# Patient Record
Sex: FEMALE | Race: WHITE | Employment: OTHER | URBAN - NONMETROPOLITAN AREA
[De-identification: names, ages, dates, MRNs, and addresses within clinical notes are randomized per-mention and may not be internally consistent; named-entity substitution may affect disease eponyms.]

---

## 2018-10-01 ENCOUNTER — HOSPITAL ENCOUNTER (OUTPATIENT)
Dept: CARDIAC CATH/INVASIVE PROCEDURES | Age: 83
Discharge: HOME | End: 2018-10-01
Attending: INTERNAL MEDICINE | Admitting: INTERNAL MEDICINE
Payer: MEDICARE

## 2018-10-01 ENCOUNTER — OFFICE VISIT (OUTPATIENT)
Dept: OTOLARYNGOLOGY | Facility: CLINIC | Age: 83
End: 2018-10-01

## 2018-10-01 VITALS
TEMPERATURE: 98.8 F | OXYGEN SATURATION: 99 % | HEIGHT: 64 IN | WEIGHT: 125 LBS | BODY MASS INDEX: 21.34 KG/M2 | HEART RATE: 60 BPM | RESPIRATION RATE: 17 BRPM | DIASTOLIC BLOOD PRESSURE: 86 MMHG | SYSTOLIC BLOOD PRESSURE: 137 MMHG

## 2018-10-01 VITALS
DIASTOLIC BLOOD PRESSURE: 85 MMHG | BODY MASS INDEX: 20.85 KG/M2 | SYSTOLIC BLOOD PRESSURE: 155 MMHG | WEIGHT: 125.13 LBS | TEMPERATURE: 97.6 F | HEIGHT: 65 IN | HEART RATE: 78 BPM

## 2018-10-01 DIAGNOSIS — H61.23 BILATERAL IMPACTED CERUMEN: Primary | ICD-10-CM

## 2018-10-01 PROBLEM — R07.9 CHEST PAIN: Status: ACTIVE | Noted: 2018-10-01

## 2018-10-01 PROCEDURE — 2500000003 HC RX 250 WO HCPCS

## 2018-10-01 PROCEDURE — 93005 ELECTROCARDIOGRAM TRACING: CPT

## 2018-10-01 PROCEDURE — 99999 PR OFFICE/OUTPT VISIT,PROCEDURE ONLY: CPT | Performed by: INTERNAL MEDICINE

## 2018-10-01 PROCEDURE — 2709999900 HC NON-CHARGEABLE SUPPLY

## 2018-10-01 PROCEDURE — C1894 INTRO/SHEATH, NON-LASER: HCPCS

## 2018-10-01 PROCEDURE — C1760 CLOSURE DEV, VASC: HCPCS

## 2018-10-01 PROCEDURE — 69210 REMOVE IMPACTED EAR WAX UNI: CPT | Performed by: NURSE PRACTITIONER

## 2018-10-01 PROCEDURE — 93458 L HRT ARTERY/VENTRICLE ANGIO: CPT | Performed by: INTERNAL MEDICINE

## 2018-10-01 PROCEDURE — 99024 POSTOP FOLLOW-UP VISIT: CPT | Performed by: INTERNAL MEDICINE

## 2018-10-01 PROCEDURE — 2580000003 HC RX 258: Performed by: INTERNAL MEDICINE

## 2018-10-01 PROCEDURE — 6360000002 HC RX W HCPCS

## 2018-10-01 RX ORDER — OXYBUTYNIN CHLORIDE 5 MG/1
TABLET ORAL
COMMUNITY
Start: 2018-08-21 | End: 2023-03-16

## 2018-10-01 RX ORDER — SODIUM CHLORIDE 9 MG/ML
INJECTION, SOLUTION INTRAVENOUS CONTINUOUS
Status: DISCONTINUED | OUTPATIENT
Start: 2018-10-01 | End: 2018-10-01 | Stop reason: HOSPADM

## 2018-10-01 RX ORDER — PHENOL 1.4 %
600 AEROSOL, SPRAY (ML) MUCOUS MEMBRANE DAILY
COMMUNITY

## 2018-10-01 RX ORDER — DIPHENHYDRAMINE HCL 25 MG
25 TABLET ORAL NIGHTLY
COMMUNITY
End: 2019-07-23

## 2018-10-01 RX ORDER — OXYBUTYNIN CHLORIDE 5 MG/1
5 TABLET ORAL 2 TIMES DAILY
COMMUNITY

## 2018-10-01 RX ORDER — CHLORHEXIDINE GLUCONATE 0.12 MG/ML
RINSE ORAL
COMMUNITY
Start: 2018-08-02

## 2018-10-01 RX ORDER — LEVOTHYROXINE SODIUM 0.07 MG/1
75 TABLET ORAL DAILY
COMMUNITY

## 2018-10-01 RX ORDER — SODIUM CHLORIDE 0.9 % (FLUSH) 0.9 %
10 SYRINGE (ML) INJECTION PRN
Status: DISCONTINUED | OUTPATIENT
Start: 2018-10-01 | End: 2018-10-01 | Stop reason: HOSPADM

## 2018-10-01 RX ORDER — GLUCOSAMINE/D3/BOSWELLIA SERRA 1500MG-400
TABLET ORAL
COMMUNITY

## 2018-10-01 RX ORDER — BIOTIN 1000 MCG
1000 TABLET,CHEWABLE ORAL DAILY
COMMUNITY

## 2018-10-01 RX ORDER — SODIUM CHLORIDE 0.9 % (FLUSH) 0.9 %
10 SYRINGE (ML) INJECTION EVERY 12 HOURS SCHEDULED
Status: DISCONTINUED | OUTPATIENT
Start: 2018-10-01 | End: 2018-10-01 | Stop reason: HOSPADM

## 2018-10-01 RX ORDER — LEVOTHYROXINE SODIUM 0.05 MG/1
TABLET ORAL
COMMUNITY
Start: 2018-09-21

## 2018-10-01 RX ADMIN — SODIUM CHLORIDE: 9 INJECTION, SOLUTION INTRAVENOUS at 14:00

## 2018-10-02 LAB
EKG P AXIS: 76 DEGREES
EKG P-R INTERVAL: 160 MS
EKG Q-T INTERVAL: 430 MS
EKG QRS DURATION: 84 MS
EKG QTC CALCULATION (BAZETT): 431 MS
EKG T AXIS: 63 DEGREES

## 2018-10-02 NOTE — H&P
Medical History:    Past Medical History:   Diagnosis Date    Cancer [facility])     SKIN CANCER    Macular degeneration     Mitral valve prolapse     Thyroid disease          Past Surgical History:    Past Surgical History:   Procedure Laterality Date    CATARACT REMOVAL Bilateral     JOINT REPLACEMENT      LEF TKNEE REPLACEMENT    JOINT REPLACEMENT      BILATERAL HIPS         Home Medications:   Prior to Admission medications    Medication Sig Start Date End Date Taking? Authorizing Provider   levothyroxine (SYNTHROID) 75 MCG tablet Take 75 mcg by mouth Daily   Yes Historical Provider, MD   sertraline (ZOLOFT) 50 MG tablet Take 50 mg by mouth daily   Yes Historical Provider, MD   oxybutynin (DITROPAN) 5 MG tablet Take 5 mg by mouth 2 times daily   Yes Historical Provider, MD   Calcium Carb-Cholecalciferol (CALCIUM 600 + D PO) Take 1 tablet by mouth Daily with lunch   Yes Historical Provider, MD   diphenhydrAMINE (BENADRYL) 25 MG tablet Take 25 mg by mouth nightly   Yes Historical Provider, MD   Biotin 1000 MCG CHEW Take 1,000 mcg by mouth daily   Yes Historical Provider, MD   CRANBERRY CONCENTRATE PO Take 850 mg by mouth 2 times daily   Yes Historical Provider, MD        Facility Administered Medications: Allergies:  Patient has no known allergies. Social History:       Social History     Social History    Marital status:      Spouse name: N/A    Number of children: N/A    Years of education: N/A     Occupational History    Not on file. Social History Main Topics    Smoking status: Former Smoker    Smokeless tobacco: Never Used    Alcohol use 4.2 oz/week     7 Cans of beer per week    Drug use: Unknown    Sexual activity: Not on file     Other Topics Concern    Not on file     Social History Narrative    No narrative on file       Family History:   History reviewed. No pertinent family history.       REVIEW OF SYSTEMS:     Except as noted in the HPI, all other systems are negative        PHYSICAL EXAMINATION:     BP (!) 146/74   Pulse 62   Temp 98.8 °F (37.1 °C) (Temporal)   Resp 16   Ht 5' 4\" (1.626 m)   Wt 125 lb (56.7 kg)   SpO2 99%   BMI 21.46 kg/m²     GENERAL - well developed and well nourished, in no amount of generalized distress; is an active participant in this examination  HEENT -  PERRLA, Hearing appears normal, conjunctiva and lids are normal, ears and nose appear normal  NECK - no thyromegaly, no JVD, trachea is in the midline  CARDIOVASCULAR - PMI is in the left mid line clavicular position, Normal S1 and S2 with a grade 1/6 systolic murmur. No S3 or S4    PULMONARY - No respiratory distress. scattered wheezes and rales. Breath sounds in both  lung fields are Decreased  ABDOMEN  - soft, non tender, no rebound, no hepatomegaly or splenomegaly  MUSCULOSKELETAL  - Prone/Supine, digitals and nails are without clubbing or cyanosis  EXTREMITIES - trace edema  NEUROLOGIC - cranial nerves, II-XII, are normal  SKIN - turgor is normal, no rash  PSYCHIATRIC - normal mood and affect, alert and orientated x 3, judgement and insight appear appropriate      LABORATORY EVALUATION & TESTING:    I have personally reviewed and interpreted the results of the following diagnostic testing      EKG and or Telemetry:  which was personally reviewed me:  Sinus rhythm,   Pulse Readings from Last 1 Encounters:   10/01/18 62    bpm,  without Acute changes    Troponin:  Not obtained; the creatinine is not obtained    CBC: No results for input(s): WBC, HGB, HCT, MCV, PLT in the last 72 hours. BMP: No results for input(s): NA, K, CL, CO2, PHOS, BUN, CREATININE in the last 72 hours. Invalid input(s): CA  Cardiac Enzymes: No results for input(s): CKTOTAL, CKMB, CKMBINDEX, TROPONINI in the last 72 hours. PT/INR: No results for input(s): PROTIME, INR in the last 72 hours. APTT: No results for input(s): APTT in the last 72 hours.   Liver Profile:  No results found for: AST, ALT, ALB, Systolic (85WCH), MQN:225 , Min:133 , OER:036    Diastolic (24BBZ), REANNA:68, Min:63, Max:102   No Continue current medications:       yes         PLAN:    1. Continue present medications except for changes as noted above  2. Continue to monitor rhythm  3. Further orders per clinical course. 4. Cardiac catheterization  5. I have discussed the risks, benefits and options with the patient and her family. They appear to understand, have no questions, and wish to proceed. This procedure is scheduled for today . Discussed with patient and family and nursing.     I greatly appreciate the opportunity and your confidence in allowing me to participate in the care of Ashley Dodson    Electronically signed by Anjelica Asher MD on 10/1/18     [id number] [facility] [place]

## 2018-10-02 NOTE — DISCHARGE SUMMARY
findings, AUC indication 15, AUC score 4, Diagnostic Catheterization Appropriate Use Criteria Description, [facility] 2012;[id number] [facility]). Selective left heart and coronary arteriography was preformed, which revealed:    1. Successful femoral artery ultrasound  2. Successful femoral artery arteriogram  3. Mild coronary artery disease  4. Left ventricular function is normal.      There were no apparent complications. This was a most reassuring report and strongly suggested that the patient's chest discomfort was of low probability of representing myocardial ischemia. The rest of the patient's hospitalization was uneventful. She was discharged home on medical therapy with outpatient follow up. Consults:     None      Significant Diagnostic Studies:    1. Selective left heart and coronary arteriography, (femoral approach), 10/01/18      Significant Therapeutic Endeavors:      1. none      Activity: activity as directed per discharge instructions. Diet:  Cardiac diet    Labs: For convenience and continuity at follow-up the following most recent labs are provided:    CBC:  No results found for: WBC, HGB, HCT, PLT    Renal:  No results found for: NA, K, CL, CO2, BUN, CREATININE, CALCIUM, PHOS      Discharge Medications:     Current Discharge Medication List           Details   levothyroxine (SYNTHROID) 75 MCG tablet Take 75 mcg by mouth Daily      sertraline (ZOLOFT) 50 MG tablet Take 50 mg by mouth daily      oxybutynin (DITROPAN) 5 MG tablet Take 5 mg by mouth 2 times daily      Calcium Carb-Cholecalciferol (CALCIUM 600 + D PO) Take 1 tablet by mouth Daily with lunch      diphenhydrAMINE (BENADRYL) 25 MG tablet Take 25 mg by mouth nightly      Biotin 1000 MCG CHEW Take 1,000 mcg by mouth daily      CRANBERRY CONCENTRATE PO Take 850 mg by mouth 2 times daily                 Disposition:      1. Reassurance  2. Risk factor modification  3.   Evaluation of etiologies of non

## 2019-05-01 ENCOUNTER — OFFICE VISIT (OUTPATIENT)
Dept: OTOLARYNGOLOGY | Facility: CLINIC | Age: 84
End: 2019-05-01

## 2019-05-01 VITALS
TEMPERATURE: 97.8 F | BODY MASS INDEX: 22.06 KG/M2 | SYSTOLIC BLOOD PRESSURE: 140 MMHG | DIASTOLIC BLOOD PRESSURE: 88 MMHG | HEART RATE: 74 BPM | HEIGHT: 65 IN | WEIGHT: 132.38 LBS

## 2019-05-01 DIAGNOSIS — H61.23 BILATERAL IMPACTED CERUMEN: Primary | ICD-10-CM

## 2019-05-01 PROCEDURE — 69210 REMOVE IMPACTED EAR WAX UNI: CPT | Performed by: NURSE PRACTITIONER

## 2019-07-05 ENCOUNTER — HOSPITAL ENCOUNTER (INPATIENT)
Age: 84
LOS: 4 days | Discharge: HOME HEALTH | DRG: 291 | End: 2019-07-09
Attending: EMERGENCY MEDICINE | Admitting: HOSPITALIST
Payer: MEDICARE

## 2019-07-05 ENCOUNTER — APPOINTMENT (OUTPATIENT)
Dept: GENERAL RADIOLOGY | Age: 84
DRG: 291 | End: 2019-07-05
Payer: MEDICARE

## 2019-07-05 DIAGNOSIS — R07.9 CHEST PAIN, UNSPECIFIED TYPE: ICD-10-CM

## 2019-07-05 DIAGNOSIS — I48.91 ATRIAL FIBRILLATION WITH RVR (HCC): Primary | ICD-10-CM

## 2019-07-05 DIAGNOSIS — I50.9 ACUTE CONGESTIVE HEART FAILURE, UNSPECIFIED HEART FAILURE TYPE (HCC): ICD-10-CM

## 2019-07-05 LAB
ALBUMIN SERPL-MCNC: 3.8 G/DL (ref 3.5–5.2)
ALP BLD-CCNC: 98 U/L (ref 35–104)
ALT SERPL-CCNC: 47 U/L (ref 5–33)
ANION GAP SERPL CALCULATED.3IONS-SCNC: 13 MMOL/L (ref 7–19)
AST SERPL-CCNC: 87 U/L (ref 5–32)
BASOPHILS ABSOLUTE: 0.1 K/UL (ref 0–0.2)
BASOPHILS RELATIVE PERCENT: 0.7 % (ref 0–1)
BILIRUB SERPL-MCNC: 0.3 MG/DL (ref 0.2–1.2)
BUN BLDV-MCNC: 15 MG/DL (ref 8–23)
CALCIUM SERPL-MCNC: 8.7 MG/DL (ref 8.8–10.2)
CHLORIDE BLD-SCNC: 101 MMOL/L (ref 98–111)
CO2: 24 MMOL/L (ref 22–29)
CREAT SERPL-MCNC: 0.7 MG/DL (ref 0.5–0.9)
EOSINOPHILS ABSOLUTE: 0.3 K/UL (ref 0–0.6)
EOSINOPHILS RELATIVE PERCENT: 3.7 % (ref 0–5)
GFR NON-AFRICAN AMERICAN: >60
GLUCOSE BLD-MCNC: 104 MG/DL (ref 74–109)
HCT VFR BLD CALC: 41.6 % (ref 37–47)
HEMOGLOBIN: 13.5 G/DL (ref 12–16)
LV EF: 45 %
LVEF MODALITY: NORMAL
LYMPHOCYTES ABSOLUTE: 2 K/UL (ref 1.1–4.5)
LYMPHOCYTES RELATIVE PERCENT: 27.6 % (ref 20–40)
MAGNESIUM: 2.1 MG/DL (ref 1.6–2.4)
MCH RBC QN AUTO: 30.8 PG (ref 27–31)
MCHC RBC AUTO-ENTMCNC: 32.5 G/DL (ref 33–37)
MCV RBC AUTO: 95 FL (ref 81–99)
MONOCYTES ABSOLUTE: 0.6 K/UL (ref 0–0.9)
MONOCYTES RELATIVE PERCENT: 8.5 % (ref 0–10)
NEUTROPHILS ABSOLUTE: 4.3 K/UL (ref 1.5–7.5)
NEUTROPHILS RELATIVE PERCENT: 59.2 % (ref 50–65)
PDW BLD-RTO: 12.6 % (ref 11.5–14.5)
PLATELET # BLD: 231 K/UL (ref 130–400)
PMV BLD AUTO: 10.5 FL (ref 9.4–12.3)
POTASSIUM SERPL-SCNC: 4.2 MMOL/L (ref 3.5–5)
PRO-BNP: 4002 PG/ML (ref 0–1800)
RBC # BLD: 4.38 M/UL (ref 4.2–5.4)
SODIUM BLD-SCNC: 138 MMOL/L (ref 136–145)
T4 FREE: 1.2 NG/DL (ref 0.9–1.7)
TOTAL PROTEIN: 7 G/DL (ref 6.6–8.7)
TROPONIN: <0.01 NG/ML (ref 0–0.03)
TSH SERPL DL<=0.05 MIU/L-ACNC: 11.05 UIU/ML (ref 0.27–4.2)
WBC # BLD: 7.3 K/UL (ref 4.8–10.8)

## 2019-07-05 PROCEDURE — 99222 1ST HOSP IP/OBS MODERATE 55: CPT | Performed by: HOSPITALIST

## 2019-07-05 PROCEDURE — 2500000003 HC RX 250 WO HCPCS: Performed by: EMERGENCY MEDICINE

## 2019-07-05 PROCEDURE — 93306 TTE W/DOPPLER COMPLETE: CPT

## 2019-07-05 PROCEDURE — 83735 ASSAY OF MAGNESIUM: CPT

## 2019-07-05 PROCEDURE — 71045 X-RAY EXAM CHEST 1 VIEW: CPT

## 2019-07-05 PROCEDURE — 84439 ASSAY OF FREE THYROXINE: CPT

## 2019-07-05 PROCEDURE — 99285 EMERGENCY DEPT VISIT HI MDM: CPT

## 2019-07-05 PROCEDURE — 6360000002 HC RX W HCPCS: Performed by: EMERGENCY MEDICINE

## 2019-07-05 PROCEDURE — 99285 EMERGENCY DEPT VISIT HI MDM: CPT | Performed by: EMERGENCY MEDICINE

## 2019-07-05 PROCEDURE — 6370000000 HC RX 637 (ALT 250 FOR IP): Performed by: INTERNAL MEDICINE

## 2019-07-05 PROCEDURE — 36415 COLL VENOUS BLD VENIPUNCTURE: CPT

## 2019-07-05 PROCEDURE — 83880 ASSAY OF NATRIURETIC PEPTIDE: CPT

## 2019-07-05 PROCEDURE — 96372 THER/PROPH/DIAG INJ SC/IM: CPT

## 2019-07-05 PROCEDURE — 6370000000 HC RX 637 (ALT 250 FOR IP): Performed by: HOSPITALIST

## 2019-07-05 PROCEDURE — 80053 COMPREHEN METABOLIC PANEL: CPT

## 2019-07-05 PROCEDURE — 84484 ASSAY OF TROPONIN QUANT: CPT

## 2019-07-05 PROCEDURE — 96375 TX/PRO/DX INJ NEW DRUG ADDON: CPT

## 2019-07-05 PROCEDURE — 93005 ELECTROCARDIOGRAM TRACING: CPT

## 2019-07-05 PROCEDURE — 6360000002 HC RX W HCPCS: Performed by: HOSPITALIST

## 2019-07-05 PROCEDURE — 85025 COMPLETE CBC W/AUTO DIFF WBC: CPT

## 2019-07-05 PROCEDURE — 84443 ASSAY THYROID STIM HORMONE: CPT

## 2019-07-05 PROCEDURE — 96374 THER/PROPH/DIAG INJ IV PUSH: CPT

## 2019-07-05 PROCEDURE — 2140000000 HC CCU INTERMEDIATE R&B

## 2019-07-05 PROCEDURE — 2580000003 HC RX 258: Performed by: EMERGENCY MEDICINE

## 2019-07-05 RX ORDER — DILTIAZEM HYDROCHLORIDE 60 MG/1
60 CAPSULE, EXTENDED RELEASE ORAL 2 TIMES DAILY
Status: DISCONTINUED | OUTPATIENT
Start: 2019-07-05 | End: 2019-07-07

## 2019-07-05 RX ORDER — DIGOXIN 125 MCG
125 TABLET ORAL DAILY
Status: DISCONTINUED | OUTPATIENT
Start: 2019-07-05 | End: 2019-07-09 | Stop reason: HOSPADM

## 2019-07-05 RX ORDER — BIOTIN 1000 MCG
1000 TABLET,CHEWABLE ORAL DAILY
Status: DISCONTINUED | OUTPATIENT
Start: 2019-07-05 | End: 2019-07-05

## 2019-07-05 RX ORDER — DILTIAZEM HYDROCHLORIDE 5 MG/ML
10 INJECTION INTRAVENOUS ONCE
Status: COMPLETED | OUTPATIENT
Start: 2019-07-05 | End: 2019-07-05

## 2019-07-05 RX ORDER — FUROSEMIDE 10 MG/ML
40 INJECTION INTRAMUSCULAR; INTRAVENOUS DAILY
Status: DISCONTINUED | OUTPATIENT
Start: 2019-07-06 | End: 2019-07-08

## 2019-07-05 RX ORDER — DIPHENHYDRAMINE HCL 25 MG
25 TABLET ORAL NIGHTLY
Status: DISCONTINUED | OUTPATIENT
Start: 2019-07-05 | End: 2019-07-09 | Stop reason: HOSPADM

## 2019-07-05 RX ORDER — FUROSEMIDE 10 MG/ML
40 INJECTION INTRAMUSCULAR; INTRAVENOUS ONCE
Status: COMPLETED | OUTPATIENT
Start: 2019-07-05 | End: 2019-07-05

## 2019-07-05 RX ADMIN — DIPHENHYDRAMINE HCL 25 MG: 25 TABLET ORAL at 20:46

## 2019-07-05 RX ADMIN — FUROSEMIDE 40 MG: 10 INJECTION, SOLUTION INTRAMUSCULAR; INTRAVENOUS at 10:41

## 2019-07-05 RX ADMIN — ENOXAPARIN SODIUM 60 MG: 100 INJECTION SUBCUTANEOUS at 20:46

## 2019-07-05 RX ADMIN — DILTIAZEM HYDROCHLORIDE 10 MG: 5 INJECTION INTRAVENOUS at 08:45

## 2019-07-05 RX ADMIN — DILTIAZEM HYDROCHLORIDE 60 MG: 60 CAPSULE, EXTENDED RELEASE ORAL at 20:46

## 2019-07-05 RX ADMIN — DIGOXIN 125 MCG: 125 TABLET ORAL at 15:36

## 2019-07-05 RX ADMIN — DILTIAZEM HYDROCHLORIDE 60 MG: 60 CAPSULE, EXTENDED RELEASE ORAL at 15:36

## 2019-07-05 RX ADMIN — ENOXAPARIN SODIUM 60 MG: 100 INJECTION SUBCUTANEOUS at 10:41

## 2019-07-05 RX ADMIN — DILTIAZEM HYDROCHLORIDE 5 MG/HR: 5 INJECTION INTRAVENOUS at 09:08

## 2019-07-05 ASSESSMENT — ENCOUNTER SYMPTOMS
GASTROINTESTINAL NEGATIVE: 1
DIARRHEA: 0
EYES NEGATIVE: 1
WHEEZING: 0
ABDOMINAL PAIN: 0
CHEST TIGHTNESS: 1
SHORTNESS OF BREATH: 1
VOMITING: 0
ALLERGIC/IMMUNOLOGIC NEGATIVE: 1
NAUSEA: 0

## 2019-07-05 ASSESSMENT — PAIN SCALES - GENERAL
PAINLEVEL_OUTOF10: 0
PAINLEVEL_OUTOF10: 0

## 2019-07-05 NOTE — CONSULTS
[address] [facility]  Cardiology Consult      Requesting MD:  Devyn Chery, *   Admit Status:  Inpatient [101]       History obtained from:   [] Patient  [] Other (specify):     Patient:  Zaheer Hu  [id number]     Chief Complaint:   Chief Complaint   Patient presents with    Chest Pain     patient complained of cp last night, patient no longer having cp    Fatigue     Patient complains of chest pain last night. Patient states she just feels weak and doesn't feel write. Per EMS patient was A-fib RVR on monior. Patient denies any cardiac history only has history of anxiety. Cardiac history .     1. Chest discomfort Etiology, doubt myocardial ischemia, cardiac catheterization, 10/01/18 mild CAD, normal left ventricular function     9/26/2018  DSE Positive for myocardial ischemia, low risk findings, AUC indication 15, AUC score 4  10/1/18  Cath  Mild CAD, normal LVFX     2. History of thyroid disease  3. History of mitral valve prolapse             Review of Systems:  Review of Systems   Constitutional: Negative. HENT: Negative. Eyes: Negative. Respiratory: Positive for chest tightness and shortness of breath. Cardiovascular: Positive for chest pain. Gastrointestinal: Negative. Endocrine: Negative. Genitourinary: Negative. Musculoskeletal: Negative. Allergic/Immunologic: Negative. Neurological: Negative. Hematological: Negative. Psychiatric/Behavioral: Negative.         Cardiac Specific Data:  Specialty Problems        Cardiology Problems    Atrial fibrillation with RVR [facility])              Past Medical History:  Past Medical History:   Diagnosis Date    Cancer [facility])     SKIN CANCER    Chest pain 10/1/2018    9/26/2018  DSE Positive for myocardial ischemia, low risk findings, AUC indication 15, AUC score 4 [facility]) 10/1/18  Cath  Mild CAD, normal LVFX     Macular degeneration     Mitral valve Admission medications    Medication Sig Start Date End Date Taking? Authorizing Provider   levothyroxine (SYNTHROID) 75 MCG tablet Take 75 mcg by mouth Daily   Yes Historical Provider, MD   sertraline (ZOLOFT) 50 MG tablet Take 50 mg by mouth daily   Yes Historical Provider, MD   oxybutynin (DITROPAN) 5 MG tablet Take 5 mg by mouth 2 times daily   Yes Historical Provider, MD   Calcium Carb-Cholecalciferol (CALCIUM 600 + D PO) Take 1 tablet by mouth Daily with lunch   Yes Historical Provider, MD   diphenhydrAMINE (BENADRYL) 25 MG tablet Take 25 mg by mouth nightly   Yes Historical Provider, MD   CRANBERRY CONCENTRATE PO Take 850 mg by mouth 2 times daily   Yes Historical Provider, MD   Biotin 1000 MCG CHEW Take 1,000 mcg by mouth daily    Historical Provider, MD       Current Meds:   diphenhydrAMINE  25 mg Oral Nightly    levothyroxine  75 mcg Oral Daily    sertraline  50 mg Oral Daily    [START ON 7/6/2019] furosemide  40 mg Intravenous Daily    enoxaparin  1 mg/kg Subcutaneous BID    digoxin  125 mcg Oral Daily    diltiazem  60 mg Oral BID       Current Infused Meds:   diltiazem (CARDIZEM) 125 mg in dextrose 5% 125 mL infusion 5 mg/hr (07/05/19 0908)       Physical Exam:  Vitals:    07/05/19 1137   BP:    Pulse: 111   Resp:    Temp:    SpO2:      No intake or output data in the 24 hours ending 07/05/19 1359  Estimated body mass index is 23.78 kg/m² as calculated from the following:    Height as of this encounter: 5' 2\" (1.575 m). Weight as of this encounter: 130 lb (59 kg). Physical Exam   Constitutional: She is oriented to person, place, and time. She appears well-developed and well-nourished. HENT:   Head: Normocephalic and atraumatic. Eyes: Pupils are equal, round, and reactive to light. EOM are normal.   Neck: Normal range of motion. Neck supple. Cardiovascular: Normal rate, regular rhythm and normal heart sounds.    Pulmonary/Chest: Effort normal and breath sounds normal.   Abdominal: Soft. Bowel sounds are normal.   Musculoskeletal: Normal range of motion. Neurological: She is alert and oriented to person, place, and time. Skin: Skin is warm and dry. Psychiatric: She has a normal mood and affect. Labs:  Recent Labs     07/05/19  0825   WBC 7.3   HGB 13.5          Recent Labs     07/05/19  0825      K 4.2      CO2 24   BUN 15   CREATININE 0.7   LABGLOM >60   MG 2.1   CALCIUM 8.7*       CK, CKMB, Troponin: @LABRCNT (CKTOTAL:3, CKMB:3, TROPONINI:3)@    Last 3 BNP:  No results for input(s): BNP in the last 72 hours. IMAGING:  Xr Chest Portable    Result Date: 7/5/2019  XR CHEST PORTABLE 7/5/2019 8:15 AM HISTORY: Dyspnea COMPARISON: None. FINDINGS: Underlying cardiomegaly. Central pulmonary vascular congestion with diffuse coarsening of interstitial markings. Subpleural Yolanda B-lines noted. No consolidation. No pleural effusion or pneumothorax. The osseous structures and surrounding soft tissues demonstrate no acute abnormality. 1. Volume overload with interstitial edema. No consolidation or pleural effusion. Signed by Dr Macrina Whitney on 7/5/2019 9:26 AM      Assessment:   Afib RVR  Normal cath before    Recommendations:  Rate control. Adde eliquiz 5 mg po bid  Fu dr Chiquita Salinas as OP    D/w pt and family.  They understand risk of bleeding

## 2019-07-05 NOTE — ED PROVIDER NOTES
Neurological: Negative for dizziness and syncope. All other systems reviewed and are negative. PAST MEDICALHISTORY     Past Medical History:   Diagnosis Date    Cancer [facility])     SKIN CANCER    Chest pain 10/1/2018    9/26/2018  DSE Positive for myocardial ischemia, low risk findings, AUC indication 15, AUC score 4 [facility]) 10/1/18  Cath  Mild CAD, normal LVFX     Macular degeneration     Mitral valve prolapse     Thyroid disease          SURGICAL HISTORY       Past Surgical History:   Procedure Laterality Date    CATARACT REMOVAL Bilateral     JOINT REPLACEMENT      LEF TKNEE REPLACEMENT    JOINT REPLACEMENT      BILATERAL HIPS         CURRENT MEDICATIONS     Previous Medications    BIOTIN 1000 MCG CHEW    Take 1,000 mcg by mouth daily    CALCIUM CARB-CHOLECALCIFEROL (CALCIUM 600 + D PO)    Take 1 tablet by mouth Daily with lunch    CRANBERRY CONCENTRATE PO    Take 850 mg by mouth 2 times daily    DIPHENHYDRAMINE (BENADRYL) 25 MG TABLET    Take 25 mg by mouth nightly    LEVOTHYROXINE (SYNTHROID) 75 MCG TABLET    Take 75 mcg by mouth Daily    OXYBUTYNIN (DITROPAN) 5 MG TABLET    Take 5 mg by mouth 2 times daily    SERTRALINE (ZOLOFT) 50 MG TABLET    Take 50 mg by mouth daily       ALLERGIES     Patient has no known allergies. FAMILY HISTORY     History reviewed. No pertinent family history. SOCIAL HISTORY       Social History     Socioeconomic History    Marital status:      Spouse name: None    Number of children: None    Years of education: None    Highest education level: None   Occupational History    None   Social Needs    Financial resource strain: None    Food insecurity:     Worry: None     Inability: None    Transportation needs:     Medical: None     Non-medical: None   Tobacco Use    Smoking status: Former Smoker    Smokeless tobacco: Never Used   Substance and Sexual Activity    Alcohol use:  Yes     Alcohol/week: 4.2 oz     Types: 7 Cans of beer per week    Drug use: Never    Sexual activity: None   Lifestyle    Physical activity:     Days per week: None     Minutes per session: None    Stress: None   Relationships    Social connections:     Talks on phone: None     Gets together: None     Attends Mosque service: None     Active member of club or organization: None     Attends meetings of clubs or organizations: None     Relationship status: None    Intimate partner violence:     Fear of current or ex partner: None     Emotionally abused: None     Physically abused: None     Forced sexual activity: None   Other Topics Concern    None   Social History Narrative    None       SCREENINGS    Liliana Coma Scale  Eye Opening: Spontaneous  Best Verbal Response: Oriented  Best Motor Response: Obeys commands  Liliana Coma Scale Score: 15        PHYSICAL EXAM    (up to 7 for level 4, 8 or more for level 5)     ED Triage Vitals   BP Temp Temp src Pulse Resp SpO2 Height Weight   07/05/19 0817 07/05/19 0820 -- 07/05/19 0817 07/05/19 0817 07/05/19 0817 07/05/19 0817 07/05/19 0817   (!) 147/84 98.3 °F (36.8 °C)  132 20 90 % 5' 3\" (1.6 m) 130 lb (59 kg)       Physical Exam   Constitutional: She is oriented to person, place, and time. She is cooperative. HENT:   Head: Atraumatic. Mouth/Throat: Oropharynx is clear and moist. Mucous membranes are not dry. No posterior oropharyngeal erythema. Eyes: Pupils are equal, round, and reactive to light. No scleral icterus. Neck: No tracheal deviation present. Cardiovascular: Normal heart sounds and intact distal pulses. An irregular rhythm present. Tachycardia present. No murmur heard. Pulmonary/Chest: Effort normal and breath sounds normal. No stridor. No respiratory distress. She has no wheezes. Abdominal: Soft. She exhibits no distension. There is no tenderness. There is no guarding. Musculoskeletal: She exhibits no edema or tenderness.    Neurological: She is alert and oriented to person, place, and time. She has normal strength. Skin: Capillary refill takes less than 2 seconds. No rash noted. No pallor. Nursing note and vitals reviewed. DIAGNOSTIC RESULTS     EKG: All EKG's areinterpreted by the Emergency Department Physician who either signs or Co-signs this chart in the absence of a cardiologist.    2442: Atrial Fib @ 122    RADIOLOGY:  Non-plain film images such as CT, Ultrasound and MRI are read by the radiologist. Plain radiographic images are visualized and preliminarily interpreted bythe emergency physician with the below findings:      XR CHEST PORTABLE   Final Result   1. Volume overload with interstitial edema. No consolidation or   pleural effusion. Signed by Dr Shantal Moran on 7/5/2019 9:26 AM              LABS:  Labs Reviewed   COMPREHENSIVE METABOLIC PANEL - Abnormal; Notable for the following components:       Result Value    Calcium 8.7 (*)     ALT 47 (*)     AST 87 (*)     All other components within normal limits   CBC WITH AUTO DIFFERENTIAL - Abnormal; Notable for the following components:    MCHC 32.5 (*)     All other components within normal limits   BRAIN NATRIURETIC PEPTIDE - Abnormal; Notable for the following components:    Pro-BNP 4,002 (*)     All other components within normal limits   TROPONIN   TSH WITHOUT REFLEX       All other labs were within normal range or not returned as of this dictation. EMERGENCY DEPARTMENT COURSE and DIFFERENTIAL DIAGNOSIS/MDM:   Vitals:    Vitals:    07/05/19 0820 07/05/19 0902 07/05/19 0905 07/05/19 0915   BP:  129/84 (!) 125/91    Pulse:  89 99    Resp:  25 25    Temp: 98.3 °F (36.8 °C)      SpO2:  (!) 86% 90% 96%   Weight:       Height:           MDM    Reassessment    Patient's EKG consistent with atrial fibrillation with rapid ventricular rate in the 120s. I have started her on a Cardizem infusion. She has been given Lovenox 1mg/kg here in the ED. Her chest x-ray appears consistent with mild to moderate volume overload.   We

## 2019-07-05 NOTE — H&P
History and Physical    Patient Name:  Jaida Monson    :  1/10/1930    Chief Complaint:   Palpitations     History of Present Illness:   Jaida Monson presents to [facility] with palpitations and dyspnea for one day along with increased feelings of fatigue.  Reports that she does not have any prior history of cardiac arrhythmia. Adrián Montilla has not had any recent illnesses, fever or chills.  In review of her previous records it appears she underwent a cardiac catheterization in 2018 that was negative for evidence of significant coronary disease.  She denies any chest pain, peripheral swelling, calf pain, PND or orthopnea. She denies cough or wheezing. In ED found to have pulmonary edema and afib with RVR. Started on dilitazem drip and lasix IV as well on lovenox full dose. Cardiology is consulted. Echo pending. Past Medical History:   has a past medical history of Cancer [facility]), Chest pain, Macular degeneration, Mitral valve prolapse, and Thyroid disease. Surgical History:   has a past surgical history that includes Cataract removal (Bilateral); joint replacement; and joint replacement. Social History:   reports that she has quit smoking. She has never used smokeless tobacco. She reports that she drinks about 4.2 oz of alcohol per week. She reports that she does not use drugs. Family History:  family history includes Heart Disease in her father and sister; Other in her mother; Stroke in her brother. Medications:  Prior to Admission medications    Medication Sig Start Date End Date Taking?  Authorizing Provider   levothyroxine (SYNTHROID) 75 MCG tablet Take 75 mcg by mouth Daily   Yes Historical Provider, MD   sertraline (ZOLOFT) 50 MG tablet Take 50 mg by mouth daily   Yes Historical Provider, MD   oxybutynin (DITROPAN) 5 MG tablet Take 5 mg by mouth 2 times daily   Yes Historical Provider, MD   Calcium Carb-Cholecalciferol (CALCIUM 600 + D PO) Take 1 tablet by mouth Daily with

## 2019-07-06 PROCEDURE — 6370000000 HC RX 637 (ALT 250 FOR IP): Performed by: HOSPITALIST

## 2019-07-06 PROCEDURE — 2140000000 HC CCU INTERMEDIATE R&B

## 2019-07-06 PROCEDURE — 99232 SBSQ HOSP IP/OBS MODERATE 35: CPT | Performed by: HOSPITALIST

## 2019-07-06 PROCEDURE — 6370000000 HC RX 637 (ALT 250 FOR IP): Performed by: INTERNAL MEDICINE

## 2019-07-06 PROCEDURE — 6360000002 HC RX W HCPCS: Performed by: HOSPITALIST

## 2019-07-06 RX ORDER — LEVOTHYROXINE SODIUM 88 UG/1
88 TABLET ORAL DAILY
Status: DISCONTINUED | OUTPATIENT
Start: 2019-07-07 | End: 2019-07-09 | Stop reason: HOSPADM

## 2019-07-06 RX ORDER — ACETAMINOPHEN 325 MG/1
650 TABLET ORAL EVERY 4 HOURS PRN
Status: DISCONTINUED | OUTPATIENT
Start: 2019-07-06 | End: 2019-07-09 | Stop reason: HOSPADM

## 2019-07-06 RX ADMIN — ENOXAPARIN SODIUM 60 MG: 100 INJECTION SUBCUTANEOUS at 21:22

## 2019-07-06 RX ADMIN — SERTRALINE HYDROCHLORIDE 50 MG: 50 TABLET ORAL at 08:24

## 2019-07-06 RX ADMIN — ACETAMINOPHEN 650 MG: 325 TABLET ORAL at 21:21

## 2019-07-06 RX ADMIN — LEVOTHYROXINE SODIUM 75 MCG: 25 TABLET ORAL at 08:23

## 2019-07-06 RX ADMIN — DIPHENHYDRAMINE HCL 25 MG: 25 TABLET ORAL at 21:22

## 2019-07-06 RX ADMIN — DIGOXIN 125 MCG: 125 TABLET ORAL at 08:24

## 2019-07-06 RX ADMIN — FUROSEMIDE 40 MG: 10 INJECTION, SOLUTION INTRAMUSCULAR; INTRAVENOUS at 08:23

## 2019-07-06 RX ADMIN — DILTIAZEM HYDROCHLORIDE 60 MG: 60 CAPSULE, EXTENDED RELEASE ORAL at 21:22

## 2019-07-06 RX ADMIN — ENOXAPARIN SODIUM 60 MG: 100 INJECTION SUBCUTANEOUS at 08:23

## 2019-07-06 RX ADMIN — DILTIAZEM HYDROCHLORIDE 60 MG: 60 CAPSULE, EXTENDED RELEASE ORAL at 08:23

## 2019-07-06 ASSESSMENT — PAIN SCALES - GENERAL
PAINLEVEL_OUTOF10: 7
PAINLEVEL_OUTOF10: 0

## 2019-07-06 NOTE — PROGRESS NOTES
[id number]  INR: No results for input(s): INR in the last 72 hours. Objective:   Vitals: /74   Pulse 74   Temp 98.4 °F (36.9 °C) (Temporal)   Resp 18   Ht 5' 2\" (1.575 m)   Wt 130 lb 9.6 oz (59.2 kg)   SpO2 93%   BMI 23.89 kg/m²   General appearance: alert, appears stated age and cooperative  Skin: Skin color, texture, turgor normal.   HEENT: Head: Normocephalic, no lesions, without obvious abnormality.   Neck: no adenopathy, no carotid bruit, no JVD and supple, symmetrical, trachea midline  Lungs: clear to auscultation bilaterally  Heart: irregularly irregular rhythm  Abdomen: soft, non-tender; bowel sounds normal; no masses,  no organomegaly  Extremities: extremities normal, atraumatic, no cyanosis or edema  Lymphatic: No significant lymph node enlargement papable  Neurologic: Mental status: Alert, oriented, thought content appropriate        Assessment & Plan:    · New onset afib  · afib with RVR- resolved with cardizem  · Pulmonary edema - tx with lasix       Disposition: per cardiology, awaiting echo    [employer]

## 2019-07-07 LAB
EKG P AXIS: NORMAL DEGREES
EKG P-R INTERVAL: NORMAL MS
EKG Q-T INTERVAL: 304 MS
EKG QRS DURATION: 86 MS
EKG QTC CALCULATION (BAZETT): 407 MS
EKG T AXIS: 85 DEGREES

## 2019-07-07 PROCEDURE — 2580000003 HC RX 258: Performed by: EMERGENCY MEDICINE

## 2019-07-07 PROCEDURE — 2500000003 HC RX 250 WO HCPCS: Performed by: EMERGENCY MEDICINE

## 2019-07-07 PROCEDURE — 6360000002 HC RX W HCPCS: Performed by: HOSPITALIST

## 2019-07-07 PROCEDURE — 6370000000 HC RX 637 (ALT 250 FOR IP): Performed by: INTERNAL MEDICINE

## 2019-07-07 PROCEDURE — 2140000000 HC CCU INTERMEDIATE R&B

## 2019-07-07 PROCEDURE — 99232 SBSQ HOSP IP/OBS MODERATE 35: CPT | Performed by: HOSPITALIST

## 2019-07-07 PROCEDURE — 6370000000 HC RX 637 (ALT 250 FOR IP): Performed by: HOSPITALIST

## 2019-07-07 RX ORDER — POLYETHYLENE GLYCOL 3350 17 G/17G
17 POWDER, FOR SOLUTION ORAL DAILY
Status: DISCONTINUED | OUTPATIENT
Start: 2019-07-07 | End: 2019-07-09 | Stop reason: HOSPADM

## 2019-07-07 RX ORDER — DILTIAZEM HYDROCHLORIDE 60 MG/1
60 CAPSULE, EXTENDED RELEASE ORAL 3 TIMES DAILY
Status: DISCONTINUED | OUTPATIENT
Start: 2019-07-07 | End: 2019-07-09

## 2019-07-07 RX ADMIN — DILTIAZEM HYDROCHLORIDE 60 MG: 60 CAPSULE, EXTENDED RELEASE ORAL at 08:20

## 2019-07-07 RX ADMIN — DILTIAZEM HYDROCHLORIDE 60 MG: 60 CAPSULE, EXTENDED RELEASE ORAL at 13:59

## 2019-07-07 RX ADMIN — DILTIAZEM HYDROCHLORIDE 5 MG/HR: 5 INJECTION INTRAVENOUS at 16:22

## 2019-07-07 RX ADMIN — ACETAMINOPHEN 650 MG: 325 TABLET ORAL at 21:09

## 2019-07-07 RX ADMIN — LEVOTHYROXINE SODIUM 88 MCG: 88 TABLET ORAL at 08:20

## 2019-07-07 RX ADMIN — APIXABAN 2.5 MG: 5 TABLET, FILM COATED ORAL at 21:09

## 2019-07-07 RX ADMIN — DIPHENHYDRAMINE HCL 25 MG: 25 TABLET ORAL at 21:09

## 2019-07-07 RX ADMIN — POLYETHYLENE GLYCOL (3350) 17 G: 17 POWDER, FOR SOLUTION ORAL at 17:31

## 2019-07-07 RX ADMIN — DILTIAZEM HYDROCHLORIDE 60 MG: 60 CAPSULE, EXTENDED RELEASE ORAL at 21:10

## 2019-07-07 RX ADMIN — DILTIAZEM HYDROCHLORIDE 5 MG/HR: 5 INJECTION INTRAVENOUS at 11:45

## 2019-07-07 RX ADMIN — DIGOXIN 125 MCG: 125 TABLET ORAL at 08:20

## 2019-07-07 RX ADMIN — FUROSEMIDE 40 MG: 10 INJECTION, SOLUTION INTRAMUSCULAR; INTRAVENOUS at 08:20

## 2019-07-07 RX ADMIN — ENOXAPARIN SODIUM 60 MG: 100 INJECTION SUBCUTANEOUS at 08:20

## 2019-07-07 RX ADMIN — SERTRALINE HYDROCHLORIDE 50 MG: 50 TABLET ORAL at 08:20

## 2019-07-07 ASSESSMENT — PAIN SCALES - GENERAL
PAINLEVEL_OUTOF10: 0

## 2019-07-07 NOTE — PLAN OF CARE
Problem: Falls - Risk of:  Goal: Will remain free from falls  Description  Will remain free from falls  7/6/2019 1907 by Ashleigh Vaz RN  Outcome: Ongoing  7/6/2019 0919 by Oma Strauss RN  Outcome: Ongoing  Goal: Absence of physical injury  Description  Absence of physical injury  Outcome: Ongoing     Problem:  Activity:  Goal: Ability to tolerate increased activity will improve  Description  Ability to tolerate increased activity will improve  Outcome: Ongoing  Goal: Expression of feelings of increased energy will increase  Description  Expression of feelings of increased energy will increase  Outcome: Ongoing     Problem: Cardiac:  Goal: Ability to maintain an adequate cardiac output will improve  Description  Ability to maintain an adequate cardiac output will improve  Outcome: Ongoing  Goal: Complications related to the disease process, condition or treatment will be avoided or minimized  Description  Complications related to the disease process, condition or treatment will be avoided or minimized  Outcome: Ongoing     Problem: Coping:  Goal: Level of anxiety will decrease  Description  Level of anxiety will decrease  Outcome: Ongoing  Goal: General experience of comfort will improve  Description  General experience of comfort will improve  Outcome: Ongoing     Problem: Health Behavior:  Goal: Ability to manage health-related needs will improve  Description  Ability to manage health-related needs will improve  Outcome: Ongoing     Problem: Safety:  Goal: Ability to remain free from injury will improve  Description  Ability to remain free from injury will improve  Outcome: Ongoing  Goal: Will show no signs and symptoms of excessive bleeding  Description  Will show no signs and symptoms of excessive bleeding  Outcome: Ongoing

## 2019-07-07 NOTE — PLAN OF CARE
Problem: Falls - Risk of:  Goal: Will remain free from falls  Description  Will remain free from falls  Outcome: Ongoing     Problem:  Activity:  Goal: Ability to tolerate increased activity will improve  Description  Ability to tolerate increased activity will improve  Outcome: Ongoing     Problem: Cardiac:  Goal: Ability to maintain an adequate cardiac output will improve  Description  Ability to maintain an adequate cardiac output will improve  Outcome: Ongoing

## 2019-07-07 NOTE — PROGRESS NOTES
Cardiology Daily Note Justice Guevara MD      Patient:  Adrianne Callahan  [id number]    Patient Active Problem List    Diagnosis Date Noted    Chest pain 10/01/2018     Priority: High    Abnormal dobutamine stress echo      Priority: High    Atrial fibrillation with RVR ([address] 07/05/2019     Priority: Low       Admit Date:  7/5/2019    Admission Problem List: Present on Admission:   Atrial fibrillation with RVR [facility])      Cardiac Specific Data:  Specialty Problems        Cardiology Problems    Atrial fibrillation with RVR ([facility])              Subjective:  Ms. Kristen Carpio feels ok. In a fib. No chestpain    Objective:   /76   Pulse 83   Temp 97.1 °F (36.2 °C) (Temporal)   Resp 14   Ht 5' 2\" (1.575 m)   Wt 131 lb (59.4 kg)   SpO2 97%   BMI 23.96 kg/m²       Intake/Output Summary (Last 24 hours) at 7/7/2019 1045  Last data filed at 7/7/2019 4217  Gross per 24 hour   Intake 510 ml   Output 1450 ml   Net -940 ml       Prior to Admission medications    Medication Sig Start Date End Date Taking?  Authorizing Provider   levothyroxine (SYNTHROID) 75 MCG tablet Take 75 mcg by mouth Daily   Yes Historical Provider, MD   sertraline (ZOLOFT) 50 MG tablet Take 50 mg by mouth daily   Yes Historical Provider, MD   oxybutynin (DITROPAN) 5 MG tablet Take 5 mg by mouth 2 times daily   Yes Historical Provider, MD   Calcium Carb-Cholecalciferol (CALCIUM 600 + D PO) Take 1 tablet by mouth Daily with lunch   Yes Historical Provider, MD   diphenhydrAMINE (BENADRYL) 25 MG tablet Take 25 mg by mouth nightly   Yes Historical Provider, MD   CRANBERRY CONCENTRATE PO Take 850 mg by mouth 2 times daily   Yes Historical Provider, MD   Biotin 1000 MCG CHEW Take 1,000 mcg by mouth daily    Historical Provider, MD        diltiazem  60 mg Oral TID    apixaban  2.5 mg Oral BID    levothyroxine  88 mcg Oral Daily    diphenhydrAMINE  25 mg Oral Nightly    sertraline  50 mg Oral Daily    furosemide  40 mg Intravenous Daily    digoxin  125 mcg

## 2019-07-07 NOTE — PROGRESS NOTES
Pt's HR is up to 150 while using bathroom. Pt states\" feeling weak\". Cardizem gtt restarted at 5 mg/hr.

## 2019-07-08 LAB
ANION GAP SERPL CALCULATED.3IONS-SCNC: 13 MMOL/L (ref 7–19)
BUN BLDV-MCNC: 13 MG/DL (ref 8–23)
CALCIUM SERPL-MCNC: 9.2 MG/DL (ref 8.8–10.2)
CHLORIDE BLD-SCNC: 97 MMOL/L (ref 98–111)
CO2: 30 MMOL/L (ref 22–29)
CREAT SERPL-MCNC: 0.8 MG/DL (ref 0.5–0.9)
GFR NON-AFRICAN AMERICAN: >60
GLUCOSE BLD-MCNC: 78 MG/DL (ref 74–109)
HCT VFR BLD CALC: 43.5 % (ref 37–47)
HEMOGLOBIN: 13.8 G/DL (ref 12–16)
MCH RBC QN AUTO: 30.6 PG (ref 27–31)
MCHC RBC AUTO-ENTMCNC: 31.7 G/DL (ref 33–37)
MCV RBC AUTO: 96.5 FL (ref 81–99)
PDW BLD-RTO: 12.6 % (ref 11.5–14.5)
PLATELET # BLD: 265 K/UL (ref 130–400)
PMV BLD AUTO: 10.5 FL (ref 9.4–12.3)
POTASSIUM SERPL-SCNC: 4.3 MMOL/L (ref 3.5–5)
RBC # BLD: 4.51 M/UL (ref 4.2–5.4)
SODIUM BLD-SCNC: 140 MMOL/L (ref 136–145)
WBC # BLD: 7.1 K/UL (ref 4.8–10.8)

## 2019-07-08 PROCEDURE — 85027 COMPLETE CBC AUTOMATED: CPT

## 2019-07-08 PROCEDURE — 6360000002 HC RX W HCPCS: Performed by: HOSPITALIST

## 2019-07-08 PROCEDURE — 36415 COLL VENOUS BLD VENIPUNCTURE: CPT

## 2019-07-08 PROCEDURE — 2140000000 HC CCU INTERMEDIATE R&B

## 2019-07-08 PROCEDURE — 6370000000 HC RX 637 (ALT 250 FOR IP): Performed by: HOSPITALIST

## 2019-07-08 PROCEDURE — 99233 SBSQ HOSP IP/OBS HIGH 50: CPT | Performed by: INTERNAL MEDICINE

## 2019-07-08 PROCEDURE — 80048 BASIC METABOLIC PNL TOTAL CA: CPT

## 2019-07-08 PROCEDURE — 6370000000 HC RX 637 (ALT 250 FOR IP): Performed by: INTERNAL MEDICINE

## 2019-07-08 PROCEDURE — 94761 N-INVAS EAR/PLS OXIMETRY MLT: CPT

## 2019-07-08 PROCEDURE — 99232 SBSQ HOSP IP/OBS MODERATE 35: CPT | Performed by: HOSPITALIST

## 2019-07-08 RX ORDER — FUROSEMIDE 40 MG/1
40 TABLET ORAL DAILY
Status: DISCONTINUED | OUTPATIENT
Start: 2019-07-08 | End: 2019-07-09 | Stop reason: HOSPADM

## 2019-07-08 RX ADMIN — LEVOTHYROXINE SODIUM 88 MCG: 88 TABLET ORAL at 05:55

## 2019-07-08 RX ADMIN — FUROSEMIDE 40 MG: 10 INJECTION, SOLUTION INTRAMUSCULAR; INTRAVENOUS at 09:13

## 2019-07-08 RX ADMIN — SERTRALINE HYDROCHLORIDE 50 MG: 50 TABLET ORAL at 09:13

## 2019-07-08 RX ADMIN — ACETAMINOPHEN 650 MG: 325 TABLET ORAL at 21:02

## 2019-07-08 RX ADMIN — APIXABAN 2.5 MG: 5 TABLET, FILM COATED ORAL at 09:13

## 2019-07-08 RX ADMIN — DILTIAZEM HYDROCHLORIDE 60 MG: 60 CAPSULE, EXTENDED RELEASE ORAL at 09:13

## 2019-07-08 RX ADMIN — DILTIAZEM HYDROCHLORIDE 60 MG: 60 CAPSULE, EXTENDED RELEASE ORAL at 21:02

## 2019-07-08 RX ADMIN — DIGOXIN 125 MCG: 125 TABLET ORAL at 09:13

## 2019-07-08 RX ADMIN — DILTIAZEM HYDROCHLORIDE 60 MG: 60 CAPSULE, EXTENDED RELEASE ORAL at 14:19

## 2019-07-08 RX ADMIN — DIPHENHYDRAMINE HCL 25 MG: 25 TABLET ORAL at 21:02

## 2019-07-08 RX ADMIN — APIXABAN 2.5 MG: 5 TABLET, FILM COATED ORAL at 21:03

## 2019-07-08 ASSESSMENT — PAIN SCALES - GENERAL
PAINLEVEL_OUTOF10: 0

## 2019-07-08 NOTE — PROGRESS NOTES
[address]    Daily HOSPITAL Progress Note            I personally saw the patient and rounded with:  Wanda Grossman RN Nurse Navigator & Belinda Jim RN on  7/8/19      The observations documented in this note, including the assessment and plan are mine         Date of Admission 7/5/2019  8:16 AM   Hospital Length of Stay  LOS: 3 days                             Date:  7/8/19  Patient: Monalisa Pelaez  Admission:  7/5/2019  8:16 AM  Admit DX: Atrial fibrillation with RVR ([facility] [address] [I48.91]  Age:  80 y.o., 1/10/1930     LOS: 3 days       Reason for initial evaluation or the patient's initial complaint    Atrial fibrillation      SUBJECTIVE:      The patient was seen and examined. All daily progress notes were reviewed. Pertinent laboratory and imaging studies were critiqued. Family present and in room during examination:  Yes: daughter     There were no new complications over night. History of the Present Illness / Chief Complaint / current status today:       According to the patient:  \"ok\"    Subjective:  Ms. Monalisa Pelaez is:  unchanged. AF with RVR. Complaint / Symptom Yes / No and Description if Yes       Chest Pain No   Shortness of Air Yes: but stable   Presyncope / Syncope No   Palpitations Yes:        The patient has the following cardiac specific problems listed in the problem list:      Specialty Problems        Cardiology Problems    Atrial fibrillation with RVR ([address]                PFSH:  New information:  no        Any Change: no          ROS:    System Any new information? Any change?         Cardiovascular No No   Pulmonary / Respiratory No No         OBJECTIVE:      /71   Pulse 92   Temp 97.2 °F (36.2 °C) (Temporal)   Resp 20   Ht 5' 2\" (1.575 m)   Wt 131 lb 12.8 oz (59.8 kg)   SpO2 (!) 88%   BMI 24.11 kg/m²     Intake/Output Summary (Last 24 hours) at 7/8/2019 1751  Last data filed at 7/8/2019 1356  Gross per 24 hour   Intake 1230 ml   Output 1100 ml   Net 130 ml       Physical Examination:    /71   Pulse 92   Temp 97.2 °F (36.2 °C) (Temporal)   Resp 20   Ht 5' 2\" (1.575 m)   Wt 131 lb 12.8 oz (59.8 kg)   SpO2 (!) 88%   BMI 24.11 kg/m²     GENERAL - well developed and well nourished; is an active participant in this examination  HEENT -  PERRLA, Hearing appears normal, conjunctiva and lids are normal, ears and nose appear normal  NECK - no thyromegaly, no JVD, trachea is in the midline  CARDIOVASCULAR - PMI is in the left mid line clavicular position, Variable S1, normal S2, without obvious murmur or gallop   PULMONARY - Yes: mild respiratory distress. scattered wheezes and rales. Breath sounds in both  lung fields are Decreased  ABDOMEN  - soft, non tender, no rebound, no hepatomegaly or splenomegaly  MUSCULOSKELETAL  - Prone/Supine, digitals and nails are without clubbing or cyanosis  EXTREMITIES - trace edema  NEUROLOGIC - cranial nerves, II-XII, are normal  SKIN - turgor is normal, no rash  PSYCHIATRIC - normal mood and affect, alert and orientated x 3, judgement and insight appear appropriate      LABORATORY EVALUATION & TESTING:    I have personally reviewed and interpreted the results of the following diagnostic endeavors     CBC:   Recent Labs     07/08/19  1446   WBC 7.1   HGB 13.8   HCT 43.5        BMP:   Recent Labs     07/08/19  1446      K 4.3   CO2 30*   BUN 13   CREATININE 0.8   LABGLOM >60   GLUCOSE 78     BNP: No results for input(s): BNP in the last 72 hours. PT/INR: No results for input(s): PROTIME, INR in the last 72 hours. APTT:No results for input(s): APTT in the last 72 hours. CARDIAC ENZYMES:No results for input(s): CKTOTAL, CKMB, CKMBINDEX, TROPONINI in the last 72 hours. FASTING LIPID PANEL:No results found for: HDL, LDLDIRECT, LDLCALC, TRIG  LIVER PROFILE:No results for input(s): AST, ALT, LABALBU in the last 72 hours.         Current Inpatient Medications:     diltiazem  60 mg Oral TID    apixaban  2.5 mg Oral to po diuretic  6. Home soon           Discussed with patient and adult child and nursing.     Electronically signed by Etienne Nair MD on 7/8/19    [facility] [place]

## 2019-07-08 NOTE — PROGRESS NOTES
HOSPITAL MEDICINE  - PROGRESS NOTE    Admit Date: 7/5/2019         CC: palpitations and dyspnea      Subjective: dyspnea still present    Objective: mild to moderate distress    Diet: DIET CARDIAC; Daily Fluid Restriction: 1800 ml  Pain is:None  Nausea:None  Bowel Movement/Flatus no    Data:   Scheduled Meds: Reviewed  Current Facility-Administered Medications   Medication Dose Route Frequency Provider Last Rate Last Dose    diltiazem (CARDIZEM 12 HR) extended release capsule 60 mg  60 mg Oral TID Aixa Ortiz MD   60 mg at 07/07/19 1359    apixaban (ELIQUIS) tablet 2.5 mg  2.5 mg Oral BID Aixa Ortiz MD        polyethylene glycol (GLYCOLAX) packet 17 g  17 g Oral Daily Lanre Angel MD   17 g at 07/07/19 1731    levothyroxine (SYNTHROID) tablet 88 mcg  88 mcg Oral Daily Lanre Angel MD   88 mcg at 07/07/19 0820    acetaminophen (TYLENOL) tablet 650 mg  650 mg Oral Q4H PRN Lanre Angel MD   650 mg at 07/06/19 2121    diltiazem 125 mg in dextrose 5 % 125 mL infusion  5 mg/hr Intravenous Continuous Rowan Rawls MD 5 mL/hr at 07/07/19 1622 5 mg/hr at 07/07/19 1622    diphenhydrAMINE (BENADRYL) tablet 25 mg  25 mg Oral Nightly Lanre Angel MD   25 mg at 07/06/19 2122    sertraline (ZOLOFT) tablet 50 mg  50 mg Oral Daily Lanre Angel MD   50 mg at 07/07/19 0820    furosemide (LASIX) injection 40 mg  40 mg Intravenous Daily Lanre Angel MD   40 mg at 07/07/19 0820    digoxin (LANOXIN) tablet 125 mcg  125 mcg Oral Daily Aixa Ortiz MD   125 mcg at 07/07/19 0820     DVT Prophylaxis: eliquis    Continuous Infusions:   diltiazem (CARDIZEM) 125 mg in dextrose 5% 125 mL infusion 5 mg/hr (07/07/19 1622)       Intake/Output Summary (Last 24 hours) at 7/7/2019 1901  Last data filed at 7/7/2019 1405  Gross per 24 hour   Intake 390 ml   Output 2250 ml   Net -1860 ml     CBC:   Recent

## 2019-07-09 VITALS
SYSTOLIC BLOOD PRESSURE: 109 MMHG | TEMPERATURE: 97 F | BODY MASS INDEX: 24.25 KG/M2 | RESPIRATION RATE: 16 BRPM | DIASTOLIC BLOOD PRESSURE: 65 MMHG | WEIGHT: 131.8 LBS | HEART RATE: 67 BPM | OXYGEN SATURATION: 90 % | HEIGHT: 62 IN

## 2019-07-09 PROCEDURE — 99233 SBSQ HOSP IP/OBS HIGH 50: CPT | Performed by: INTERNAL MEDICINE

## 2019-07-09 PROCEDURE — 6370000000 HC RX 637 (ALT 250 FOR IP): Performed by: INTERNAL MEDICINE

## 2019-07-09 PROCEDURE — 99239 HOSP IP/OBS DSCHRG MGMT >30: CPT | Performed by: HOSPITALIST

## 2019-07-09 PROCEDURE — 6370000000 HC RX 637 (ALT 250 FOR IP): Performed by: HOSPITALIST

## 2019-07-09 PROCEDURE — 97161 PT EVAL LOW COMPLEX 20 MIN: CPT

## 2019-07-09 PROCEDURE — 97116 GAIT TRAINING THERAPY: CPT

## 2019-07-09 RX ORDER — DILTIAZEM HYDROCHLORIDE 90 MG/1
90 CAPSULE, EXTENDED RELEASE ORAL 2 TIMES DAILY
Status: DISCONTINUED | OUTPATIENT
Start: 2019-07-09 | End: 2019-07-09 | Stop reason: HOSPADM

## 2019-07-09 RX ORDER — DILTIAZEM HYDROCHLORIDE 90 MG/1
90 CAPSULE, EXTENDED RELEASE ORAL 2 TIMES DAILY
Qty: 60 CAPSULE | Refills: 0 | Status: SHIPPED | OUTPATIENT
Start: 2019-07-09 | End: 2019-07-23 | Stop reason: SDUPTHER

## 2019-07-09 RX ORDER — DIGOXIN 125 MCG
125 TABLET ORAL
Qty: 15 TABLET | Refills: 0 | Status: SHIPPED | OUTPATIENT
Start: 2019-07-09 | End: 2019-07-23 | Stop reason: SDUPTHER

## 2019-07-09 RX ORDER — FUROSEMIDE 40 MG/1
40 TABLET ORAL DAILY
Qty: 30 TABLET | Refills: 0 | Status: SHIPPED | OUTPATIENT
Start: 2019-07-10 | End: 2019-07-23 | Stop reason: SDUPTHER

## 2019-07-09 RX ORDER — METOPROLOL SUCCINATE 25 MG/1
12.5 TABLET, EXTENDED RELEASE ORAL DAILY
Status: DISCONTINUED | OUTPATIENT
Start: 2019-07-09 | End: 2019-07-09 | Stop reason: HOSPADM

## 2019-07-09 RX ORDER — METOPROLOL SUCCINATE 25 MG/1
12.5 TABLET, EXTENDED RELEASE ORAL DAILY
Qty: 15 TABLET | Refills: 0 | Status: SHIPPED | OUTPATIENT
Start: 2019-07-09 | End: 2019-07-23 | Stop reason: SDUPTHER

## 2019-07-09 RX ADMIN — DILTIAZEM HYDROCHLORIDE 60 MG: 60 CAPSULE, EXTENDED RELEASE ORAL at 14:37

## 2019-07-09 RX ADMIN — DIGOXIN 125 MCG: 125 TABLET ORAL at 08:30

## 2019-07-09 RX ADMIN — DILTIAZEM HYDROCHLORIDE 60 MG: 60 CAPSULE, EXTENDED RELEASE ORAL at 08:30

## 2019-07-09 RX ADMIN — SERTRALINE HYDROCHLORIDE 50 MG: 50 TABLET ORAL at 08:30

## 2019-07-09 RX ADMIN — LEVOTHYROXINE SODIUM 88 MCG: 88 TABLET ORAL at 06:19

## 2019-07-09 RX ADMIN — POLYETHYLENE GLYCOL (3350) 17 G: 17 POWDER, FOR SOLUTION ORAL at 08:30

## 2019-07-09 RX ADMIN — FUROSEMIDE 40 MG: 40 TABLET ORAL at 08:30

## 2019-07-09 RX ADMIN — APIXABAN 2.5 MG: 5 TABLET, FILM COATED ORAL at 08:30

## 2019-07-09 ASSESSMENT — PAIN SCALES - GENERAL
PAINLEVEL_OUTOF10: 0

## 2019-07-09 NOTE — PROGRESS NOTES
capsule 90 mg  90 mg Oral BID Lam Hunter MD        metoprolol succinate (TOPROL XL) extended release tablet 12.5 mg  12.5 mg Oral Daily Lam Hunter MD        furosemide (LASIX) tablet 40 mg  40 mg Oral Daily Lam Hunter MD   40 mg at 07/09/19 0830    apixaban (ELIQUIS) tablet 2.5 mg  2.5 mg Oral BID Teo Whaley MD   2.5 mg at 07/09/19 0830    polyethylene glycol (GLYCOLAX) packet 17 g  17 g Oral Daily Rosette Mac MD   17 g at 07/09/19 0830    levothyroxine (SYNTHROID) tablet 88 mcg  88 mcg Oral Daily Rosette Mac MD   88 mcg at 07/09/19 1905    acetaminophen (TYLENOL) tablet 650 mg  650 mg Oral Q4H PRN Rosette Mac MD   650 mg at 07/08/19 2102    diphenhydrAMINE (BENADRYL) tablet 25 mg  25 mg Oral Nightly Rosette Mac MD   25 mg at 07/08/19 2102    sertraline (ZOLOFT) tablet 50 mg  50 mg Oral Daily Rosette Mac MD   50 mg at 07/09/19 0830    digoxin (LANOXIN) tablet 125 mcg  125 mcg Oral Daily Teo Whaley MD   125 mcg at 07/09/19 0830     Allergies: Patient has no known allergies. Past Medical History:   Diagnosis Date    Cancer [facility])     SKIN CANCER    Chest pain 10/1/2018    9/26/2018  DSE Positive for myocardial ischemia, low risk findings, AUC indication 15, AUC score 4 [facility]) 10/1/18  Cath  Mild CAD, normal LVFX     Macular degeneration     Mitral valve prolapse     Thyroid disease      Past Surgical History:   Procedure Laterality Date    CATARACT REMOVAL Bilateral     JOINT REPLACEMENT      LEF TKNEE REPLACEMENT    JOINT REPLACEMENT      BILATERAL HIPS     Family History   Problem Relation Age of Onset    Other Mother     Heart Disease Father     Heart Disease Sister     Stroke Brother      Social History     Tobacco Use    Smoking status: Former Smoker    Smokeless tobacco: Never Used   Substance Use Topics    Alcohol use:  Yes     Alcohol/week: 4.2 oz     Types: 7 Cans of beer per week MEDICAL DECISION MAKING                   Cardiac Specific Problem / Diagnosis   Discussion and Data Reviewed Diagnostic Procedures Ordered Management Options Selected                 1. Presenting problem / symptom     AF with RVR  are improving    Pulse Readings from Last 1 Encounters:   07/09/19 67     No Continue current medications:      No                 2. Chest discomfort Initial presentation during this evaluation    Review and summation of old records:     9/26/2018  DSE Positive for myocardial ischemia, low risk findings, AUC indication 15, AUC score 4 [facility])  10/1/18  Cath  Mild CAD, normal LVFX          No Continue current medications:     Yes:                  3. Systemic arterial hypertension Initial presentation during this evaluation The blood pressure for the lastr 36 hours has been:  Systolic (49FWQ), KXR:712 , Min:102 , YVN:192    Diastolic (68SHX), MLZ:12, Min:65, Max:77       No Continue current medications:        yes         CONSIDERATIONS, THOUGHTS, AND PLANS:    1. Continue present medications except for changes as noted above  2. Continue to monitor rhythm  3. Further orders per clinical course. 4. Medications adjusted, ok with me to dc, will see in the office           Discussed with patient and adult child and nursing.     Electronically signed by Andrew Sauer MD on 7/9/19        [facility] [place]

## 2019-07-09 NOTE — PROGRESS NOTES
Minimum  [] Moderate   [] Maximum     Comment:    BALANCE   Sitting    [x] Good  [] Fair    [] Poor   Standing    [] Good  [x] Fair    [] Poor    ASSESSMENT   [x] Would benefit from skilled physical therapy   [] Independent    [] Unable to participate in physical therapy at this time    PLAN  [] Discharge from skilled physical therapy      Physical therapy to see daily for 2 weeks the reassess.  Plan of care to include:   [x] Gait training  [x] Therex   [] Stair training   [x] Balance training  [] Transfer training  [] Bed mobility   [] PreAmbulation  [] Brace/Sling training [] Family education      GOALS   [] Independent and safe with all functional mobility (MET)    [] Bed mobility:   [x] Transfers: Independent   [x] Ambulation 400 Feet with No Assistance           Assistive device: None     [] Up and down  Steps with  Assist    RECOMMENDATION FOR TRANSFERS:      [x] Assist of 1  [] Assist of 2  [] CLIFFORD steady    TREATMENT TODAY    AMBULATION   Distance: 200 feet     Device:    [x] No device [] Rolling Walker [] Walker [] [employer] [] Hand Held     Assistance:    [] Independent [] Modified Independent [] Stand by / Supervision   [x] Minimum  [] Moderate   [] Maximum  [] CGA    TRANSFERS   Bed to chair   [] Independent [] Modified Independent [x] Stand by / Supervision   [] Minimum  [] Moderate   [] Maximum      Bed mobility   Supine to sit   [x] Independent [] Modified Independent [] Stand by / Supervision   [] Minimum  [] Moderate   [] Maximum        Roll  [] Left  [] Right    [] Independent [] Modified Independent [] Stand by / Supervision   [] Minimum  [] Moderate   [] Maximum      Scoot  [x] Side to side  [x] Up and down   [] Independent [x] Modified Independent [] Stand by / Supervision   [] Minimum  [] Moderate   [] Maximum       Comment:      Electronically signed by Noah Peña PT on 7/9/2019 at 11:54 AM

## 2019-07-09 NOTE — CARE COORDINATION
Spoke with patient regarding MD orders for [facility] services. Patient agreeable and has chosen [facility]. Referral Faxed. [address] [place] [address] [phone]. [phone]. Per Dr Ellis Ryan office, patient has not seen PCP since September and that was just a follow up for labs. Faxing hospital records to PCP and nurse is checking to see if Dr Allen Christensen will follow prior to her follow up visit or if she has to be seen before [facility] can admit.   Electronically signed by Yary Cummins RN on 7/9/2019 at 2:40 PM

## 2019-07-10 ENCOUNTER — TELEPHONE (OUTPATIENT)
Dept: CARDIOLOGY | Age: 84
End: 2019-07-10

## 2019-07-10 ENCOUNTER — CARE COORDINATION (OUTPATIENT)
Dept: CASE MANAGEMENT | Age: 84
End: 2019-07-10

## 2019-07-10 NOTE — LETTER
including skilled nursing facilities, home health agencies, inpatient rehabilitation facilities, and long term care hospitals. [facility] is working closely with the doctors and other health care providers that care for you during and following your hospital stay and for a period of time after you leave the hospital. By working together, the health care providers are trying to more efficiently provide well-managed, high quality, patient-centered care as you undergo treatment. Hospitals, doctors, and other health care providers that care for you following a hospital stay may receive an additional payment for providing better, more coordinated health care. Medicare will monitor your care to make sure you and others get high quality care. Your feedback is important     Medicare may also ask you to answer a survey about the services and care you received from [address] will be mailed to you. Your feedback will improve care for all people with Medicare who receive care from [facility]. Completion of this survey is optional.     Get more information     For more information about the Bundled Payments for [address] [place] [address], you can:    · Visit the CMS BPCI Advanced Website at http://deleon-[place].net/ initiatives/bpci-advanced   · Call the [facility] team at (177) [phone]. · Call 1-800-MEDICARE (8-283.552.8404). TTY users can call 7-735.181.3652     If you have concerns or complaints about your care, talk to your health care provider, or contact your Beneficiary and Family Centered Quality Improvement Organization [facility]). To get your [facility]'s phone number, visit Medicare.gov/contacts or call 1-800-MEDICARE. · To find a different hospital, visit www. hospitalcompare.[facility].gov or call 1-800[employer] MEDICARE (1-746.172.2903). TTY users should call 5-417.136.2703.

## 2019-07-18 ENCOUNTER — CARE COORDINATION (OUTPATIENT)
Dept: CASE MANAGEMENT | Age: 84
End: 2019-07-18

## 2019-07-23 ENCOUNTER — OFFICE VISIT (OUTPATIENT)
Dept: CARDIOLOGY | Age: 84
End: 2019-07-23
Payer: MEDICARE

## 2019-07-23 ENCOUNTER — TELEPHONE (OUTPATIENT)
Dept: CARDIOLOGY | Age: 84
End: 2019-07-23

## 2019-07-23 VITALS
HEART RATE: 69 BPM | SYSTOLIC BLOOD PRESSURE: 110 MMHG | HEIGHT: 62 IN | BODY MASS INDEX: 24.48 KG/M2 | OXYGEN SATURATION: 75 % | DIASTOLIC BLOOD PRESSURE: 70 MMHG | WEIGHT: 133 LBS

## 2019-07-23 DIAGNOSIS — I34.0 MITRAL VALVE INSUFFICIENCY, UNSPECIFIED ETIOLOGY: ICD-10-CM

## 2019-07-23 DIAGNOSIS — Z87.09 HISTORY OF PULMONARY EDEMA: ICD-10-CM

## 2019-07-23 DIAGNOSIS — I51.9 LEFT VENTRICULAR DYSFUNCTION: ICD-10-CM

## 2019-07-23 DIAGNOSIS — I50.20 SYSTOLIC CONGESTIVE HEART FAILURE, UNSPECIFIED HF CHRONICITY (HCC): ICD-10-CM

## 2019-07-23 DIAGNOSIS — I48.91 NEW ONSET ATRIAL FIBRILLATION (HCC): Primary | ICD-10-CM

## 2019-07-23 DIAGNOSIS — Z79.01 CHRONIC ANTICOAGULATION: ICD-10-CM

## 2019-07-23 DIAGNOSIS — I48.91 ATRIAL FIBRILLATION, UNSPECIFIED TYPE (HCC): Primary | ICD-10-CM

## 2019-07-23 DIAGNOSIS — I48.91 ATRIAL FIBRILLATION, UNSPECIFIED TYPE (HCC): ICD-10-CM

## 2019-07-23 LAB
ANION GAP SERPL CALCULATED.3IONS-SCNC: 13 MMOL/L (ref 7–19)
BUN BLDV-MCNC: 17 MG/DL (ref 8–23)
CALCIUM SERPL-MCNC: 9.4 MG/DL (ref 8.8–10.2)
CHLORIDE BLD-SCNC: 96 MMOL/L (ref 98–111)
CO2: 29 MMOL/L (ref 22–29)
CREAT SERPL-MCNC: 0.8 MG/DL (ref 0.5–0.9)
GFR NON-AFRICAN AMERICAN: >60
GLUCOSE BLD-MCNC: 96 MG/DL (ref 74–109)
POTASSIUM SERPL-SCNC: 4.2 MMOL/L (ref 3.5–5)
PRO-BNP: 1547 PG/ML (ref 0–1800)
SODIUM BLD-SCNC: 138 MMOL/L (ref 136–145)

## 2019-07-23 PROCEDURE — G8420 CALC BMI NORM PARAMETERS: HCPCS | Performed by: NURSE PRACTITIONER

## 2019-07-23 PROCEDURE — G8427 DOCREV CUR MEDS BY ELIG CLIN: HCPCS | Performed by: NURSE PRACTITIONER

## 2019-07-23 PROCEDURE — 1111F DSCHRG MED/CURRENT MED MERGE: CPT | Performed by: NURSE PRACTITIONER

## 2019-07-23 PROCEDURE — 1123F ACP DISCUSS/DSCN MKR DOCD: CPT | Performed by: NURSE PRACTITIONER

## 2019-07-23 PROCEDURE — 99214 OFFICE O/P EST MOD 30 MIN: CPT | Performed by: NURSE PRACTITIONER

## 2019-07-23 PROCEDURE — 1090F PRES/ABSN URINE INCON ASSESS: CPT | Performed by: NURSE PRACTITIONER

## 2019-07-23 PROCEDURE — 1036F TOBACCO NON-USER: CPT | Performed by: NURSE PRACTITIONER

## 2019-07-23 PROCEDURE — 4040F PNEUMOC VAC/ADMIN/RCVD: CPT | Performed by: NURSE PRACTITIONER

## 2019-07-23 PROCEDURE — 93000 ELECTROCARDIOGRAM COMPLETE: CPT | Performed by: NURSE PRACTITIONER

## 2019-07-23 RX ORDER — FUROSEMIDE 40 MG/1
40 TABLET ORAL DAILY
Qty: 90 TABLET | Refills: 3 | Status: SHIPPED | OUTPATIENT
Start: 2019-07-23 | End: 2020-02-10 | Stop reason: SDUPTHER

## 2019-07-23 RX ORDER — METOPROLOL SUCCINATE 25 MG/1
12.5 TABLET, EXTENDED RELEASE ORAL DAILY
Qty: 45 TABLET | Refills: 3 | Status: SHIPPED | OUTPATIENT
Start: 2019-07-23 | End: 2020-02-10 | Stop reason: SDUPTHER

## 2019-07-23 RX ORDER — DIGOXIN 125 MCG
125 TABLET ORAL
Qty: 45 TABLET | Refills: 3 | Status: SHIPPED | OUTPATIENT
Start: 2019-07-23 | End: 2020-02-10 | Stop reason: SDUPTHER

## 2019-07-23 RX ORDER — LEVOTHYROXINE SODIUM 0.07 MG/1
75 TABLET ORAL DAILY
Qty: 180 TABLET | Refills: 3 | Status: CANCELLED | OUTPATIENT
Start: 2019-07-23

## 2019-07-23 RX ORDER — MELATONIN 10 MG
10 CAPSULE ORAL PRN
COMMUNITY

## 2019-07-23 RX ORDER — DILTIAZEM HYDROCHLORIDE 90 MG/1
90 CAPSULE, EXTENDED RELEASE ORAL 2 TIMES DAILY
Qty: 180 CAPSULE | Refills: 3 | Status: SHIPPED | OUTPATIENT
Start: 2019-07-23 | End: 2020-02-10 | Stop reason: SDUPTHER

## 2019-07-23 NOTE — PATIENT INSTRUCTIONS
parts of the heart (the atria) to quiver, or fibrillate. Your heart rate also may be faster than normal.  Atrial fibrillation can be dangerous because if the heartbeat isn't strong and steady, blood can collect, or pool, in the atria. And pooled blood is more likely to form clots. Clots can travel to the brain, block blood flow, and cause a stroke. Atrial fibrillation can also lead to heart failure. Treatment for atrial fibrillation helps prevent stroke and heart failure. It also helps relieve symptoms. Atrial fibrillation is often caused by another heart problem. It may happen after heart surgery. It may also be caused by other problems, such as an overactive thyroid gland or lung disease. Many people with atrial fibrillation are able to live full and active lives. What are the symptoms? Some people feel symptoms when they have episodes of atrial fibrillation. But other people don't notice any symptoms. If you have symptoms, you may feel:  · A fluttering, racing, or pounding feeling in your chest called palpitations. · Weak or tired. · Dizzy or lightheaded. · Short of breath. · Chest pain. · Confused. You may notice signs of atrial fibrillation when you check your pulse. Your pulse may seem uneven or fast.  What can you expect when you have atrial fibrillation? At first, spells of atrial fibrillation may come on suddenly and last a short time. It may go away on its own or it goes away after treatment. This is called paroxysmal atrial fibrillation. Over time, the spells may last longer and occur more often. They often don't go away on their own. How is it treated? Treatments can help you feel better and prevent future problems, especially stroke and heart failure. The main types of treatment slow the heart rate, control the heart rhythm, and help prevent stroke. Your treatment will depend on the cause of your atrial fibrillation, your symptoms, and your risk for stroke. · Heart rate treatment. is used to lower the risk of stroke caused by a blood clot in people with a heart rhythm disorder called atrial fibrillation. Apixaban is also used after hip or knee replacement surgery to prevent a type of blood clot called deep vein thrombosis (DVT), which can lead to blood clots in the lungs (pulmonary embolism). Apixaban is also used to treat DVT or pulmonary embolism (PE), and to lower your risk of having a repeat DVT or PE. Apixaban may also be used for purposes not listed in this medication guide. What should I discuss with my healthcare provider before taking apixaban? You should not take apixaban if you are allergic to it, or if you have:  · an artificial heart valve; or  · active bleeding from a surgery, injury, or other cause. Apixaban may cause you to bleed more easily, especially if you have a bleeding disorder that is inherited or caused by disease. Tell your doctor if you have ever had:  · kidney disease (or if you are on dialysis);  · liver disease;  · if you are older than 80; or  · if you weigh less than 132 pounds. Apixaban can cause a very serious blood clot around your spinal cord if you undergo a spinal tap or receive spinal anesthesia (epidural). This type of blood clot could cause long-term paralysis, and may be more likely to occur if:   · you have a spinal catheter in place or if a catheter has been recently removed;  · you have a history of spinal surgery or repeated spinal taps;  · you have recently had a spinal tap or epidural anesthesia;  · you are taking an NSAID (nonsteroidal anti-inflammatory drug)--ibuprofen (Advil, Motrin), naproxen (Aleve), diclofenac, indomethacin, meloxicam, and others; or  · you are using other medicines to treat or prevent blood clots. Taking apixaban during pregnancy may increase the risk of bleeding while you are pregnant or during your delivery. Tell your doctor if you are pregnant or plan to become pregnant.   You should not breast-feed while using ask your healthcare professional. [place] [address] any warranty or liability for your use of this information.

## 2019-07-24 PROBLEM — I34.0 MITRAL VALVE INSUFFICIENCY: Status: ACTIVE | Noted: 2019-07-24

## 2019-07-24 PROBLEM — I51.9 LEFT VENTRICULAR DYSFUNCTION: Status: ACTIVE | Noted: 2019-07-24

## 2019-07-24 PROBLEM — I50.20 SYSTOLIC CONGESTIVE HEART FAILURE (HCC): Status: ACTIVE | Noted: 2019-07-24

## 2019-07-24 PROBLEM — Z79.01 CHRONIC ANTICOAGULATION: Status: ACTIVE | Noted: 2019-07-24

## 2019-07-24 NOTE — TELEPHONE ENCOUNTER
Lord Garcia,  I sent you a note about this patient's lab I checked yesterday. I told the patient and her daughter that I would discuss echo results from hospital with Dr. Romain Mays. At this point, he does not want to recheck the echo. His plan is to leave her in chronic atrial fibrillation as she is doing well. Treat with continued heart rate control on current medications and Eliquis.   She has a follow up schedule with  in August.  Thanks, Roberto Dolan

## 2019-07-24 NOTE — PROGRESS NOTES
A79.5    Systolic congestive heart failure (HCC) I50.20    Mitral valve insufficiency I34.0    Chronic anticoagulation Z79.01    Left ventricular dysfunction I51.9     Past Medical History:   Diagnosis Date    Cancer [facility])     SKIN CANCER    Chest pain 10/1/2018    9/26/2018  DSE Positive for myocardial ischemia, low risk findings, AUC indication 15, AUC score 4 [facility]) 10/1/18  Cath  Mild CAD, normal LVFX     Macular degeneration     Mitral valve prolapse     Systolic congestive heart failure ([address] 7/24/2019    Thyroid disease      Past Surgical History:   Procedure Laterality Date    CATARACT REMOVAL Bilateral     JOINT REPLACEMENT      LEF TKNEE REPLACEMENT    JOINT REPLACEMENT      BILATERAL HIPS     Family History   Problem Relation Age of Onset    Other Mother     Heart Disease Father     Heart Disease Sister     Stroke Brother      Social History     Tobacco Use    Smoking status: Former Smoker    Smokeless tobacco: Never Used   Substance Use Topics    Alcohol use:  Yes     Alcohol/week: 7.0 standard drinks     Types: 7 Cans of beer per week     Comment: 1 or 2 cans of beer or burbon a day      Current Outpatient Medications   Medication Sig Dispense Refill    Melatonin 10 MG CAPS Take 10 mg by mouth as needed      Multiple Vitamins-Minerals (VISION VITAMINS PO) Take by mouth      apixaban (ELIQUIS) 2.5 MG TABS tablet Take 1 tablet by mouth 2 times daily 180 tablet 3    digoxin (LANOXIN) 125 MCG tablet Take 1 tablet by mouth every 48 hours 45 tablet 3    diltiazem (CARDIZEM 12 HR) 90 MG extended release capsule Take 1 capsule by mouth 2 times daily 180 capsule 3    furosemide (LASIX) 40 MG tablet Take 1 tablet by mouth daily 90 tablet 3    metoprolol succinate (TOPROL XL) 25 MG extended release tablet Take 0.5 tablets by mouth daily 45 tablet 3    levothyroxine (SYNTHROID) 75 MCG tablet Take 75 mcg by mouth Daily      sertraline (ZOLOFT) 50 MG tablet Take 50 mg Gerhardt King is alert, cooperative, and pleasant. Well groomed. No acute distress. Body habitus - Body mass index is 24.33 kg/m². HEENT - Head is normocephalic. No circumoral cyanosis. Dentition is normal.  EYES -   Lids normal without ptosis. No discharge, edema or subconjunctival hemorrhage. Neck - Symmetrical without apparent mass or lymphadenopathy. Respiratory - Normal respiratory effort without use of accessory muscles. Ausculatation reveals vesicular breath sounds without crackles, wheezes, rub or rhonchi. Cardiovascular - No jugular venous distention. Auscultation reveals irregularly irregular rate and rhythm. No audible clicks, gallop or rub. No murmur. No lower extremity varicosities. No carotid bruits. Abdominal -  No visible distention, mass or pulsations. Extremities - No clubbing or cyanosis. No statis dermatitis or ulcers. No edema. Musculoskeletal -   No Osler's nodes. No kyphosis or scoliosis. Gait is even and regular without limp or shuffle. Ambulates without assistance. Skin -  Warm and dry; no rash or pallor. No new surgical wound. Neurological - No focal neurological deficits. Thought processes coherent. No apparent tremor. Oriented to person, place and time. Psychiatric -  Appropriate affect and mood. Assessment:     Diagnosis Orders   1. Atrial fibrillation, unspecified type (HCC)  EKG 12 lead    Basic Metabolic Panel    Brain Natriuretic Peptide   2. Mitral valve insufficiency, unspecified etiology  Basic Metabolic Panel    Brain Natriuretic Peptide   3. Left ventricular dysfunction     4. Systolic congestive heart failure, unspecified HF chronicity ([address]     5.  Chronic anticoagulation      Eliquis     Data reviewed:  7/5/19 echo   Findings    Mitral Valve   Mitral valve leaflets are mildly thickened with preserved leaflet mobility.   Calcification of the mitral valve noted.   Severe mitral regurgitation is present.      Aortic Valve   The bruising or cough productive of bloody sputum, notify the office. Information on this blood thinner has been included in your after visit summary. *Weigh yourself without clothing at the same time each day. Record your weight. Call the office if you gain 3 pounds or more in 2 to 3 days or 5 pounds in one week. A sudden weight gain may mean that your heart failure is getting worse. *Sodium causes your body to hold on to extra water. This may cause your heart failure symptoms to get worse. Limit sodium to 2,000 milligrams (mg) a day or less. That is less than 1 teaspoon of salt a day, including all the salt you eat in cooking or in packaged foods. Coronary artery disease risk factors you can control: Smoking, high blood pressure, high cholesterol, diabetes, being overweight, lack of exercise and stress. Continue heart healthy diet. Take medications as directed. Exercise as tolerated. Strive for 15 minutes of exercise most days of the week. If asked to keep a blood pressure log, do so for 2 weeks. Call the office to report readings at [phone]. Blood pressure goal  is less than 120/70. Elevated blood pressure at 120-129/80 or less. High blood pressure at 130-139/80-89. If you are taking cholesterol lowering medications, it is recommended that lab work be checked annually. Always keep a current medication list. Bring your medications to every office visit.      MYRTLE Navarrete

## 2019-07-31 ENCOUNTER — CARE COORDINATION (OUTPATIENT)
Dept: CASE MANAGEMENT | Age: 84
End: 2019-07-31

## 2019-07-31 NOTE — CARE COORDINATION
[facility] Transitions Follow Up Call    2019    Patient: Keyur Arcos  Patient : 1/10/1930   MRN: <[id number]>  Reason for Admission:   Discharge Date: 19 RARS: Readmission Risk Score: 9             Care Transitions Subsequent and Final Call    Subsequent and Final Calls  Care Transitions Interventions  Other Interventions: Follow Up : Unable to reach patient for follow up call for BPCI-A. Voice message left with my contact information for return call. Will re attempt at later time.     Future Appointments   Date Time Provider Natasha Jerome   2019 11:30 AM Shayy Becerra MD [facility] Curtis RN

## 2019-07-31 NOTE — CARE COORDINATION
[facility] Transitions Follow Up Call    2019    Patient: Keyur Arcos  Patient : 1/10/1930   MRN: <[id number]>  Reason for Admission:   Discharge Date: 19 RARS: Readmission Risk Score: 9         Spoke with: [address] Transitions Subsequent and Final Call    Subsequent and Final Calls  Do you have any ongoing symptoms?:  Yes  Onset of Patient-reported symptoms:  Other  Do you currently have any active services?:  No  Do you have any needs or concerns that I can assist you with?:  No  Identified Barriers:  None  Care Transitions Interventions  Other Interventions: Follow Up : Spoke with patient who said she does continue to have episodes of her heart racing but all her doctors are aware, pt denies any dizziness, falls. Pt c/o of feeling tired most days. Pt said she is continuing to weigh daily and I reviewed CHF education with her again, pt was unaware to call Dr. Abe Clark office with a weight gain of 3 more pounds in 2-3 days or 5 # in a week, said she did not recall anyone telling her, pt got a pen and wrote down the information . Pt weight today was #131. Pt said no weight gain from yesterday. Also reviewed the sodium restriction with her. Upon review of chart noted patient TSH was 11 on her admission to the hospital on 19 and did not see a repeat lab, Pt said during our call that she thought they were supposed to change her thyroid medicine. Called Dr Richardson York office and informed the nurse Marie to let Dr. Richardson York know abou the TSH level to make sure they were aware. Nurse said she would certainly let Dr. Richardson York know.   Will follow up with patient   Future Appointments   Date Time Provider Natasha Jerome   2019 11:30 AM Shayy Becerra MD [facility]       Galina Curtis RN

## 2019-08-20 ENCOUNTER — CARE COORDINATION (OUTPATIENT)
Dept: CASE MANAGEMENT | Age: 84
End: 2019-08-20

## 2019-08-27 ENCOUNTER — OFFICE VISIT (OUTPATIENT)
Dept: CARDIOLOGY | Age: 84
End: 2019-08-27
Payer: MEDICARE

## 2019-08-27 VITALS
DIASTOLIC BLOOD PRESSURE: 70 MMHG | SYSTOLIC BLOOD PRESSURE: 118 MMHG | WEIGHT: 133 LBS | BODY MASS INDEX: 24.48 KG/M2 | HEART RATE: 88 BPM | HEIGHT: 62 IN

## 2019-08-27 DIAGNOSIS — I50.20 SYSTOLIC CONGESTIVE HEART FAILURE, UNSPECIFIED HF CHRONICITY (HCC): ICD-10-CM

## 2019-08-27 DIAGNOSIS — I48.91 NEW ONSET ATRIAL FIBRILLATION (HCC): Primary | ICD-10-CM

## 2019-08-27 PROCEDURE — 1036F TOBACCO NON-USER: CPT | Performed by: INTERNAL MEDICINE

## 2019-08-27 PROCEDURE — 99212 OFFICE O/P EST SF 10 MIN: CPT | Performed by: INTERNAL MEDICINE

## 2019-08-27 PROCEDURE — 4040F PNEUMOC VAC/ADMIN/RCVD: CPT | Performed by: INTERNAL MEDICINE

## 2019-08-27 PROCEDURE — 1123F ACP DISCUSS/DSCN MKR DOCD: CPT | Performed by: INTERNAL MEDICINE

## 2019-08-27 PROCEDURE — 1090F PRES/ABSN URINE INCON ASSESS: CPT | Performed by: INTERNAL MEDICINE

## 2019-08-27 PROCEDURE — G8420 CALC BMI NORM PARAMETERS: HCPCS | Performed by: INTERNAL MEDICINE

## 2019-08-27 PROCEDURE — G8427 DOCREV CUR MEDS BY ELIG CLIN: HCPCS | Performed by: INTERNAL MEDICINE

## 2019-08-27 NOTE — PROGRESS NOTES
prescribed medications and monitor the course of the therapy. Breathing is good no complaints of cardiac symptoms. No Continue current medications:    {Yes           3. Continue current medications:                Discussed with adult child. Follow Up Visit Scheduled for:  6 month(s)    I greatly appreciate the opportunity to meet Liya Whyte and your confidence in allowing me to participate in her cardiovascular care. [facility] [place], [place] am scribing for and in the presence of DINH Brady MD,FACC. Shawn Mckinney MA    8/27/19 11:49 AM  IDINH MD, [facility], personally performed the services described in this documentation as scribed by Shawn Mckinney in my presence, and it is both accurate and complete. Electronically signed by Cecille Ochoa.  Sea Brady MD, [facility]    9/4/19 10:51 AM

## 2019-09-04 ENCOUNTER — CARE COORDINATION (OUTPATIENT)
Dept: CASE MANAGEMENT | Age: 84
End: 2019-09-04

## 2019-09-17 ENCOUNTER — CARE COORDINATION (OUTPATIENT)
Dept: CASE MANAGEMENT | Age: 84
End: 2019-09-17

## 2019-10-03 ENCOUNTER — CARE COORDINATION (OUTPATIENT)
Dept: CASE MANAGEMENT | Age: 84
End: 2019-10-03

## 2019-10-29 ENCOUNTER — OFFICE VISIT (OUTPATIENT)
Dept: OTOLARYNGOLOGY | Age: 84
End: 2019-10-29
Payer: MEDICARE

## 2019-10-29 VITALS
TEMPERATURE: 97.6 F | WEIGHT: 133.38 LBS | SYSTOLIC BLOOD PRESSURE: 122 MMHG | HEART RATE: 52 BPM | DIASTOLIC BLOOD PRESSURE: 78 MMHG | BODY MASS INDEX: 24.54 KG/M2 | HEIGHT: 62 IN

## 2019-10-29 DIAGNOSIS — H91.93 BILATERAL HEARING LOSS, UNSPECIFIED HEARING LOSS TYPE: ICD-10-CM

## 2019-10-29 DIAGNOSIS — H61.23 BILATERAL IMPACTED CERUMEN: Primary | ICD-10-CM

## 2019-10-29 PROCEDURE — 69210 REMOVE IMPACTED EAR WAX UNI: CPT | Performed by: NURSE PRACTITIONER

## 2019-10-29 PROCEDURE — 99212 OFFICE O/P EST SF 10 MIN: CPT | Performed by: NURSE PRACTITIONER

## 2020-02-10 RX ORDER — DILTIAZEM HYDROCHLORIDE 90 MG/1
90 CAPSULE, EXTENDED RELEASE ORAL 2 TIMES DAILY
Qty: 180 CAPSULE | Refills: 3 | Status: SHIPPED | OUTPATIENT
Start: 2020-02-10 | End: 2020-03-11

## 2020-02-10 RX ORDER — DIGOXIN 125 MCG
125 TABLET ORAL
Qty: 180 TABLET | Refills: 3 | Status: SHIPPED | OUTPATIENT
Start: 2020-02-10 | End: 2020-03-11

## 2020-02-10 RX ORDER — METOPROLOL SUCCINATE 25 MG/1
12.5 TABLET, EXTENDED RELEASE ORAL DAILY
Qty: 45 TABLET | Refills: 3 | Status: SHIPPED | OUTPATIENT
Start: 2020-02-10 | End: 2020-03-11

## 2020-02-10 RX ORDER — FUROSEMIDE 40 MG/1
40 TABLET ORAL DAILY
Qty: 90 TABLET | Refills: 3 | Status: SHIPPED | OUTPATIENT
Start: 2020-02-10 | End: 2020-03-11

## 2020-04-08 ENCOUNTER — TELEPHONE (OUTPATIENT)
Dept: CARDIOLOGY | Age: 85
End: 2020-04-08

## 2020-04-08 NOTE — TELEPHONE ENCOUNTER
LVM; Left vm for pt to contact the office; If pt is not having any current cardiac issues please r/s pt out 2-3  Months due to COVID-19      If pt wants to keep OV please note that in her appt notes.     Thank you

## 2020-04-16 ENCOUNTER — TELEPHONE (OUTPATIENT)
Dept: CARDIOLOGY | Age: 85
End: 2020-04-16

## 2020-09-03 ENCOUNTER — OFFICE VISIT (OUTPATIENT)
Dept: OTOLARYNGOLOGY | Age: 85
End: 2020-09-03
Payer: MEDICARE

## 2020-09-03 VITALS
BODY MASS INDEX: 22.45 KG/M2 | WEIGHT: 122 LBS | SYSTOLIC BLOOD PRESSURE: 110 MMHG | DIASTOLIC BLOOD PRESSURE: 66 MMHG | HEIGHT: 62 IN

## 2020-09-03 PROBLEM — H61.23 BILATERAL IMPACTED CERUMEN: Status: ACTIVE | Noted: 2020-09-03

## 2020-09-03 PROCEDURE — G8427 DOCREV CUR MEDS BY ELIG CLIN: HCPCS | Performed by: PHYSICIAN ASSISTANT

## 2020-09-03 PROCEDURE — 4040F PNEUMOC VAC/ADMIN/RCVD: CPT | Performed by: PHYSICIAN ASSISTANT

## 2020-09-03 PROCEDURE — 1090F PRES/ABSN URINE INCON ASSESS: CPT | Performed by: PHYSICIAN ASSISTANT

## 2020-09-03 PROCEDURE — 69210 REMOVE IMPACTED EAR WAX UNI: CPT | Performed by: PHYSICIAN ASSISTANT

## 2020-09-03 PROCEDURE — G8420 CALC BMI NORM PARAMETERS: HCPCS | Performed by: PHYSICIAN ASSISTANT

## 2020-09-03 PROCEDURE — 1123F ACP DISCUSS/DSCN MKR DOCD: CPT | Performed by: PHYSICIAN ASSISTANT

## 2020-09-03 PROCEDURE — 1036F TOBACCO NON-USER: CPT | Performed by: PHYSICIAN ASSISTANT

## 2020-09-03 RX ORDER — DIGOXIN 125 MCG
125 TABLET ORAL DAILY
COMMUNITY

## 2020-09-03 ASSESSMENT — ENCOUNTER SYMPTOMS
SORE THROAT: 0
EYE PAIN: 0
FACIAL SWELLING: 0
SINUS PRESSURE: 0
EYE DISCHARGE: 0
TROUBLE SWALLOWING: 0
SINUS PAIN: 0
RHINORRHEA: 0
VOICE CHANGE: 0

## 2020-09-03 NOTE — ASSESSMENT & PLAN NOTE
Bilateral cerumen impaction- will clean today with microscopic assistance  Plan: Patient is advised to follow-up with me in 6 months for a cerumen check. She is reminded to call if she has any further questions or problems.

## 2020-09-03 NOTE — PROGRESS NOTES
[facility] OTOLARYNGOLOGY/ENT  Ms. Caryn Islas is a very pleasant 80-year-old  female that was a patient of Tiara Scott in the past.  She currently presents for 6-month follow-up for cerumen check. She reports that she has noticed decreased hearing from both ears despite utilizing hearing aid bilaterally. Physical examination in the office confirmed the presence of bilateral cerumen impaction with the TM totally occluded bilaterally. Allergies: Patient has no known allergies. Current Outpatient Medications   Medication Sig Dispense Refill    digoxin (LANOXIN) 125 MCG tablet Take 125 mcg by mouth daily      apixaban (ELIQUIS) 2.5 MG TABS tablet Take 1 tablet by mouth 2 times daily 180 tablet 3    Melatonin 10 MG CAPS Take 10 mg by mouth as needed      Multiple Vitamins-Minerals (VISION VITAMINS PO) Take by mouth      levothyroxine (SYNTHROID) 75 MCG tablet Take 75 mcg by mouth Daily      sertraline (ZOLOFT) 50 MG tablet Take 50 mg by mouth daily      oxybutynin (DITROPAN) 5 MG tablet Take 5 mg by mouth 2 times daily      Calcium Carb-Cholecalciferol (CALCIUM 600 + D PO) Take 1 tablet by mouth Daily with lunch      Biotin 1000 MCG CHEW Take 1,000 mcg by mouth daily      CRANBERRY CONCENTRATE PO Take 850 mg by mouth 2 times daily      digoxin (LANOXIN) 125 MCG tablet Take 1 tablet by mouth every 48 hours 180 tablet 3    diltiazem (CARDIZEM 12 HR) 90 MG extended release capsule Take 1 capsule by mouth 2 times daily 180 capsule 3    furosemide (LASIX) 40 MG tablet Take 1 tablet by mouth daily 90 tablet 3    metoprolol succinate (TOPROL XL) 25 MG extended release tablet Take 0.5 tablets by mouth daily 45 tablet 3     No current facility-administered medications for this visit.         Past Surgical History:   Procedure Laterality Date    CATARACT REMOVAL Bilateral     JOINT REPLACEMENT      LEF TKNEE REPLACEMENT    JOINT REPLACEMENT      BILATERAL HIPS       Past Medical History: Diagnosis Date    Cancer [facility])     SKIN CANCER    Chest pain 10/1/2018    9/26/2018  DSE Positive for myocardial ischemia, low risk findings, AUC indication 15, AUC score 4 [email or web address] 10/1/18  Cath  Mild CAD, normal LVFX     Macular degeneration     Mitral valve prolapse     Systolic congestive heart failure ([address] 7/24/2019    Thyroid disease        Family History   Problem Relation Age of Onset    Other Mother     Heart Disease Father     Heart Disease Sister     Stroke Brother        Social History     Tobacco Use    Smoking status: Former Smoker    Smokeless tobacco: Never Used   Substance Use Topics    Alcohol use: Yes     Alcohol/week: 7.0 standard drinks     Types: 7 Cans of beer per week     Comment: 1 or 2 cans of beer or burbon a day           REVIEW OF SYSTEMS:  all other systems reviewed and are negative  Review of Systems   Constitutional: Negative for chills and fever. HENT: Positive for ear pain and hearing loss. Negative for congestion, dental problem, ear discharge, facial swelling, nosebleeds, postnasal drip, rhinorrhea, sinus pressure, sinus pain, sneezing, sore throat, tinnitus, trouble swallowing and voice change. Eyes: Negative for pain and discharge. Comments:     PHYSICAL EXAM:    /66   Ht 5' 2\" (1.575 m)   Wt 122 lb (55.3 kg)   BMI 22.31 kg/m²   Body mass index is 22.31 kg/m².     General Appearance: well developed  and well nourished  Head/ Face: normocephalic and atraumatic  Vocal Quality: good/ normal  Ears: Right Ear: External: external ears normal Otoscopy Ear Canal: cerumen impaction Otoscopy TM: TM's normal and TM's mobile Left Ear: External: external ears normal Otoscopy Ear Canal: cerumen impaction Otoscopy TM: TM's normal and TM's mobile  Hearing: diminished  Nose: nares normal and septum midline  Neck: supple and mass No  Thyroid: normal and nodules No    Assessment & Plan:    Problem List Items Addressed This Visit Bilateral impacted cerumen - Primary     Bilateral cerumen impaction- will clean today with microscopic assistance  Plan: Patient is advised to follow-up with me in 6 months for a cerumen check. She is reminded to call if she has any further questions or problems. Relevant Orders    59648 - ID REMOVE IMPACTED EAR WAX (Completed)          Orders Placed This Encounter   Procedures    29916 - ID REMOVE IMPACTED EAR WAX     With microscopic assistance the cerumen impaction was removed from the right ear with assistance of suction and curettage. The left ear cerumen impaction was successfully removed with assistance of suction and curettage. Incidentally found in both ears was a small white cylinder piece that was floating into the ear canal on top of the cerumen this was removed from both sides with no complications. It should be noted that the left side had 2 foreign bodies with the right side being 1. TMs were well visualized and was noted to be normal.       No orders of the defined types were placed in this encounter. Electronically signed by Cody Vallejo PA-C on 9/3/20 at 11:15 AM CDT          Please note that this chart was generated using dragon dictation software. Although every effort was made to ensure the accuracy of this automated transcription, some errors in transcription may have occurred.

## 2020-11-03 PROBLEM — I48.91 ATRIAL FIBRILLATION (HCC): Status: RESOLVED | Noted: 2020-11-03 | Resolved: 2020-11-03

## 2021-03-09 ENCOUNTER — OFFICE VISIT (OUTPATIENT)
Dept: ENT CLINIC | Age: 86
End: 2021-03-09
Payer: MEDICARE

## 2021-03-09 VITALS
HEIGHT: 62 IN | DIASTOLIC BLOOD PRESSURE: 68 MMHG | SYSTOLIC BLOOD PRESSURE: 134 MMHG | WEIGHT: 122 LBS | BODY MASS INDEX: 22.45 KG/M2

## 2021-03-09 DIAGNOSIS — H61.23 BILATERAL IMPACTED CERUMEN: Primary | ICD-10-CM

## 2021-03-09 PROCEDURE — 69210 REMOVE IMPACTED EAR WAX UNI: CPT | Performed by: PHYSICIAN ASSISTANT

## 2021-03-09 PROCEDURE — G8420 CALC BMI NORM PARAMETERS: HCPCS | Performed by: PHYSICIAN ASSISTANT

## 2021-03-09 PROCEDURE — 1123F ACP DISCUSS/DSCN MKR DOCD: CPT | Performed by: PHYSICIAN ASSISTANT

## 2021-03-09 PROCEDURE — G8484 FLU IMMUNIZE NO ADMIN: HCPCS | Performed by: PHYSICIAN ASSISTANT

## 2021-03-09 PROCEDURE — 4040F PNEUMOC VAC/ADMIN/RCVD: CPT | Performed by: PHYSICIAN ASSISTANT

## 2021-03-09 PROCEDURE — 1090F PRES/ABSN URINE INCON ASSESS: CPT | Performed by: PHYSICIAN ASSISTANT

## 2021-03-09 PROCEDURE — G8427 DOCREV CUR MEDS BY ELIG CLIN: HCPCS | Performed by: PHYSICIAN ASSISTANT

## 2021-03-09 PROCEDURE — 1036F TOBACCO NON-USER: CPT | Performed by: PHYSICIAN ASSISTANT

## 2021-03-09 NOTE — PROGRESS NOTES
Ms. Gisella Busby is a very pleasant 51-year-old  female that is well-known to me due to a previous cerumen impaction that was cleaned back in September of last year. She reports that today she had her hearing aids checked and was found to have a recurrent bilateral cerumen impaction. She admits to increased muffled hearing as well as drainage from the ear canal.      Physical examination with the microscope revealed the patient to have bilateral cerumen impactions. The cerumen impactions were removed successfully with assistance of suction and alligator graspers. The TMs remain normal-appearing with no evidence of infection bilaterally. Neck exam demonstrated no lymphadenopathy or thyromegaly to exam.  Oral exam was also felt to be unremarkable. Impression: Recurrent cerumen impaction bilaterally-successfully removed    Plan: The patient was recommended a cerumen check in 6 months. Due to her poor mobility and her daughter living in [place], [place], they report that they will call if she develops recurring symptoms.       Electronically signed by Lino Duong PA-C on 3/9/21 at 4:50 PM CST

## 2022-07-05 ENCOUNTER — TELEPHONE (OUTPATIENT)
Dept: ENT CLINIC | Age: 87
End: 2022-07-05

## 2022-07-05 NOTE — TELEPHONE ENCOUNTER
Nina Beaulieu daughter Nichole Christiansen called requesting an appointment for Fri 78 to remove ear wax. Not listed on most recent HIPAA , [facility] not able to accommodate.  She request office return her call @ [phone]      Thank you

## 2022-07-08 ENCOUNTER — OFFICE VISIT (OUTPATIENT)
Dept: UROLOGY | Facility: CLINIC | Age: 87
End: 2022-07-08

## 2022-07-08 ENCOUNTER — PATIENT ROUNDING (BHMG ONLY) (OUTPATIENT)
Dept: UROLOGY | Facility: CLINIC | Age: 87
End: 2022-07-08

## 2022-07-08 VITALS — HEIGHT: 61 IN | TEMPERATURE: 98.5 F | WEIGHT: 115 LBS | BODY MASS INDEX: 21.71 KG/M2

## 2022-07-08 DIAGNOSIS — N39.0 RECURRENT UTI: ICD-10-CM

## 2022-07-08 DIAGNOSIS — R35.0 URINARY FREQUENCY: Primary | ICD-10-CM

## 2022-07-08 DIAGNOSIS — N95.2 ATROPHIC VAGINITIS: ICD-10-CM

## 2022-07-08 DIAGNOSIS — N81.10 FEMALE BLADDER PROLAPSE: ICD-10-CM

## 2022-07-08 LAB
BILIRUB BLD-MCNC: NEGATIVE MG/DL
CLARITY, POC: CLEAR
COLOR UR: YELLOW
GLUCOSE UR STRIP-MCNC: NEGATIVE MG/DL
KETONES UR QL: NEGATIVE
LEUKOCYTE EST, POC: ABNORMAL
NITRITE UR-MCNC: NEGATIVE MG/ML
PH UR: 6.5 [PH] (ref 5–8)
PROT UR STRIP-MCNC: NEGATIVE MG/DL
RBC # UR STRIP: ABNORMAL /UL
SP GR UR: 1.01 (ref 1–1.03)
UROBILINOGEN UR QL: NORMAL

## 2022-07-08 PROCEDURE — 99204 OFFICE O/P NEW MOD 45 MIN: CPT

## 2022-07-08 PROCEDURE — 51798 US URINE CAPACITY MEASURE: CPT

## 2022-07-08 PROCEDURE — 81001 URINALYSIS AUTO W/SCOPE: CPT

## 2022-07-08 RX ORDER — DIGOXIN 125 MCG
125 TABLET ORAL
COMMUNITY

## 2022-07-08 RX ORDER — ESTRADIOL 0.1 MG/G
2 CREAM VAGINAL DAILY
Qty: 2 G | Refills: 12 | Status: SHIPPED | OUTPATIENT
Start: 2022-07-08 | End: 2022-07-11 | Stop reason: ALTCHOICE

## 2022-07-08 RX ORDER — METOPROLOL SUCCINATE 25 MG/1
12.5 TABLET, EXTENDED RELEASE ORAL DAILY
COMMUNITY
Start: 2022-06-21

## 2022-07-08 RX ORDER — APIXABAN 2.5 MG/1
2.5 TABLET, FILM COATED ORAL 2 TIMES DAILY
COMMUNITY
Start: 2022-07-02

## 2022-07-08 RX ORDER — LEVOTHYROXINE SODIUM 0.07 MG/1
75 TABLET ORAL DAILY
COMMUNITY
Start: 2022-06-18

## 2022-07-08 RX ORDER — DILTIAZEM HYDROCHLORIDE 90 MG/1
90 CAPSULE, EXTENDED RELEASE ORAL 2 TIMES DAILY
COMMUNITY
Start: 2022-06-21

## 2022-07-11 ENCOUNTER — PROCEDURE VISIT (OUTPATIENT)
Dept: UROLOGY | Facility: CLINIC | Age: 87
End: 2022-07-11

## 2022-07-11 ENCOUNTER — OFFICE VISIT (OUTPATIENT)
Dept: ENT CLINIC | Age: 87
End: 2022-07-11
Payer: MEDICARE

## 2022-07-11 VITALS — WEIGHT: 112 LBS | TEMPERATURE: 98.5 F | HEIGHT: 62 IN | BODY MASS INDEX: 20.61 KG/M2

## 2022-07-11 VITALS
SYSTOLIC BLOOD PRESSURE: 126 MMHG | BODY MASS INDEX: 20.43 KG/M2 | HEIGHT: 62 IN | DIASTOLIC BLOOD PRESSURE: 76 MMHG | WEIGHT: 111 LBS

## 2022-07-11 DIAGNOSIS — R35.0 URINARY FREQUENCY: Primary | ICD-10-CM

## 2022-07-11 DIAGNOSIS — H61.23 BILATERAL IMPACTED CERUMEN: Primary | ICD-10-CM

## 2022-07-11 PROCEDURE — 1036F TOBACCO NON-USER: CPT | Performed by: PHYSICIAN ASSISTANT

## 2022-07-11 PROCEDURE — 99213 OFFICE O/P EST LOW 20 MIN: CPT | Performed by: PHYSICIAN ASSISTANT

## 2022-07-11 PROCEDURE — 69210 REMOVE IMPACTED EAR WAX UNI: CPT | Performed by: PHYSICIAN ASSISTANT

## 2022-07-11 PROCEDURE — 87186 SC STD MICRODIL/AGAR DIL: CPT

## 2022-07-11 PROCEDURE — 1123F ACP DISCUSS/DSCN MKR DOCD: CPT | Performed by: PHYSICIAN ASSISTANT

## 2022-07-11 PROCEDURE — 87077 CULTURE AEROBIC IDENTIFY: CPT

## 2022-07-11 PROCEDURE — G8427 DOCREV CUR MEDS BY ELIG CLIN: HCPCS | Performed by: PHYSICIAN ASSISTANT

## 2022-07-11 PROCEDURE — 87086 URINE CULTURE/COLONY COUNT: CPT

## 2022-07-11 PROCEDURE — G8420 CALC BMI NORM PARAMETERS: HCPCS | Performed by: PHYSICIAN ASSISTANT

## 2022-07-11 PROCEDURE — 1090F PRES/ABSN URINE INCON ASSESS: CPT | Performed by: PHYSICIAN ASSISTANT

## 2022-07-11 RX ORDER — ESTRADIOL 0.1 MG/G
2 CREAM VAGINAL 2 TIMES WEEKLY
Qty: 2 G | Refills: 12 | Status: SHIPPED | OUTPATIENT
Start: 2022-07-11

## 2022-07-11 ASSESSMENT — ENCOUNTER SYMPTOMS
VOICE CHANGE: 0
EYE PAIN: 0
RHINORRHEA: 0
EYE DISCHARGE: 0
SORE THROAT: 0
FACIAL SWELLING: 0
SINUS PAIN: 0
SINUS PRESSURE: 0
TROUBLE SWALLOWING: 0

## 2022-07-11 NOTE — PROGRESS NOTES
[facility] OTOLARYNGOLOGY/ENT  Ms. Reno Villegas is a pleasant 43-year-old  female that presents for a cerumen check. Patient reports that she has had lots of squealing from her hearing aids as well as diminished hearing that is mostly localized to the left ear. She has a history of having recurrent cerumen impactions in the past.        Allergies: Patient has no known allergies. Current Outpatient Medications   Medication Sig Dispense Refill    digoxin (LANOXIN) 125 MCG tablet Take 125 mcg by mouth daily      apixaban (ELIQUIS) 2.5 MG TABS tablet Take 1 tablet by mouth 2 times daily 180 tablet 3    Melatonin 10 MG CAPS Take 10 mg by mouth as needed      Multiple Vitamins-Minerals (VISION VITAMINS PO) Take by mouth      levothyroxine (SYNTHROID) 75 MCG tablet Take 75 mcg by mouth Daily      sertraline (ZOLOFT) 50 MG tablet Take 50 mg by mouth daily      oxybutynin (DITROPAN) 5 MG tablet Take 5 mg by mouth 2 times daily      Calcium Carb-Cholecalciferol (CALCIUM 600 + D PO) Take 1 tablet by mouth Daily with lunch      Biotin 1000 MCG CHEW Take 1,000 mcg by mouth daily      CRANBERRY CONCENTRATE PO Take 850 mg by mouth 2 times daily      digoxin (LANOXIN) 125 MCG tablet Take 1 tablet by mouth every 48 hours 180 tablet 3    diltiazem (CARDIZEM 12 HR) 90 MG extended release capsule Take 1 capsule by mouth 2 times daily 180 capsule 3    furosemide (LASIX) 40 MG tablet Take 1 tablet by mouth daily 90 tablet 3    metoprolol succinate (TOPROL XL) 25 MG extended release tablet Take 0.5 tablets by mouth daily 45 tablet 3     No current facility-administered medications for this visit.        Past Surgical History:   Procedure Laterality Date    CATARACT REMOVAL Bilateral     JOINT REPLACEMENT      LEF TKNEE REPLACEMENT    JOINT REPLACEMENT      BILATERAL HIPS       Past Medical History:   Diagnosis Date    Cancer [facility])     SKIN CANCER    Chest pain 10/1/2018    9/26/2018  DSE Positive for myocardial ischemia, low risk findings, AUC indication 15, AUC score 4 [facility]) 10/1/18  Cath  Mild CAD, normal LVFX     Frequent UTI     Macular degeneration     Mitral valve prolapse     Systolic congestive heart failure ([facility] [address] [address] 7/24/2019    Thyroid disease        Family History   Problem Relation Age of Onset    Other Mother     Heart Disease Father     Heart Disease Sister     Stroke Brother        Social History     Tobacco Use    Smoking status: Former Smoker    Smokeless tobacco: Never Used   Substance Use Topics    Alcohol use: Yes     Alcohol/week: 7.0 standard drinks     Types: 7 Cans of beer per week     Comment: 1 or 2 cans of beer or burbon a day           REVIEW OF SYSTEMS:  all other systems reviewed and are negative  Review of Systems   Constitutional: Negative for chills and fever. HENT: Positive for hearing loss (wears hearing aids). Negative for congestion, dental problem, ear discharge, ear pain, facial swelling, nosebleeds, postnasal drip, rhinorrhea, sinus pressure, sinus pain, sneezing, sore throat, tinnitus, trouble swallowing and voice change. Eyes: Negative for pain and discharge. Neurological: Negative for dizziness and headaches. Comments:     PHYSICAL EXAM:    /76   Ht 5' 2\" (1.575 m)   Wt 111 lb (50.3 kg)   BMI 20.30 kg/m²   Body mass index is 20.3 kg/m².     General Appearance: well developed  and well nourished  Head/ Face: normocephalic and atraumatic  Vocal Quality: good/ normal  Ears: Right Ear: External: external ears normal Otoscopy Ear Canal: cerumen impaction Otoscopy TM: TM's normal and TM's mobile Left Ear: External: external ears normal Otoscopy Ear Canal: cerumen impaction Otoscopy TM: TM's normal and TM's mobile  Hearing: grossly intact  Nose: nares normal and septum midline  Neck: supple and adenopathy none palpable  Thyroid: normal and nodules No    Assessment & Plan:    Problem List Items Addressed This Visit     Bilateral impacted cerumen - Primary     Bilateral cerumen impaction-successfully removed today with microscopic guidance  Plan: Patient is to follow-up in 1 year for routine cerumen check. She was reminded to call if she has diminished hearing or has any questions. Relevant Orders    33990 - [place] REMOVE IMPACTED EAR WAX (Completed)          Orders Placed This Encounter   Procedures    68367 - [place] REMOVE IMPACTED EAR WAX     With microscopic guidance, the cerumen impaction was removed bilaterally with assistance of alligator graspers and suction. The TMs appear to be normal with no evidence of perforation or infection. No orders of the defined types were placed in this encounter. Electronically signed by Elisha Delgado PA-C on 7/11/22 at 1:06 PM CDT          Please note that this chart was generated using dragon dictation software. Although every effort was made to ensure the accuracy of this automated transcription, some errors in transcription may have occurred.

## 2022-07-11 NOTE — ASSESSMENT & PLAN NOTE
Bilateral cerumen impaction-successfully removed today with microscopic guidance  Plan: Patient is to follow-up in 1 year for routine cerumen check. She was reminded to call if she has diminished hearing or has any questions.

## 2022-07-13 ENCOUNTER — TELEPHONE (OUTPATIENT)
Dept: UROLOGY | Facility: CLINIC | Age: 87
End: 2022-07-13

## 2022-07-13 DIAGNOSIS — N30.00 ACUTE CYSTITIS WITHOUT HEMATURIA: ICD-10-CM

## 2022-07-13 DIAGNOSIS — N30.00 ACUTE CYSTITIS WITHOUT HEMATURIA: Primary | ICD-10-CM

## 2022-07-13 LAB — BACTERIA SPEC AEROBE CULT: ABNORMAL

## 2022-07-13 RX ORDER — CEFDINIR 300 MG/1
300 CAPSULE ORAL 2 TIMES DAILY
Qty: 20 CAPSULE | Refills: 0 | Status: SHIPPED | OUTPATIENT
Start: 2022-07-13

## 2022-07-13 RX ORDER — CEFDINIR 300 MG/1
300 CAPSULE ORAL 2 TIMES DAILY
Qty: 20 CAPSULE | Refills: 0 | Status: SHIPPED | OUTPATIENT
Start: 2022-07-13 | End: 2022-07-13 | Stop reason: SDUPTHER

## 2022-08-01 ENCOUNTER — OFFICE VISIT (OUTPATIENT)
Dept: UROLOGY | Facility: CLINIC | Age: 87
End: 2022-08-01

## 2022-08-01 ENCOUNTER — HOSPITAL ENCOUNTER (OUTPATIENT)
Dept: ULTRASOUND IMAGING | Facility: HOSPITAL | Age: 87
Discharge: HOME | End: 2022-08-01

## 2022-08-01 VITALS — HEIGHT: 62 IN | WEIGHT: 114 LBS | BODY MASS INDEX: 20.98 KG/M2

## 2022-08-01 DIAGNOSIS — N81.10 FEMALE BLADDER PROLAPSE: ICD-10-CM

## 2022-08-01 DIAGNOSIS — R35.0 URINARY FREQUENCY: ICD-10-CM

## 2022-08-01 DIAGNOSIS — N39.0 RECURRENT UTI: Primary | ICD-10-CM

## 2022-08-01 LAB
BILIRUB BLD-MCNC: NEGATIVE MG/DL
CLARITY, POC: CLEAR
COLOR UR: YELLOW
GLUCOSE UR STRIP-MCNC: NEGATIVE MG/DL
KETONES UR QL: NEGATIVE
LEUKOCYTE EST, POC: ABNORMAL
NITRITE UR-MCNC: NEGATIVE MG/ML
PH UR: 6.5 [PH] (ref 5–8)
PROT UR STRIP-MCNC: NEGATIVE MG/DL
RBC # UR STRIP: NEGATIVE /UL
SP GR UR: 1.01 (ref 1–1.03)
UROBILINOGEN UR QL: NORMAL

## 2022-08-01 PROCEDURE — 81001 URINALYSIS AUTO W/SCOPE: CPT

## 2022-08-01 PROCEDURE — 99213 OFFICE O/P EST LOW 20 MIN: CPT

## 2022-08-01 PROCEDURE — 76775 US EXAM ABDO BACK WALL LIM: CPT

## 2022-08-01 PROCEDURE — 51798 US URINE CAPACITY MEASURE: CPT

## 2022-08-01 RX ORDER — LATANOPROST 50 UG/ML
SOLUTION/ DROPS OPHTHALMIC
COMMUNITY
Start: 2022-07-13

## 2022-08-01 RX ORDER — NITROFURANTOIN MACROCRYSTALS 50 MG/1
50 CAPSULE ORAL NIGHTLY
Qty: 90 CAPSULE | Refills: 0 | Status: SHIPPED | OUTPATIENT
Start: 2022-08-01 | End: 2022-10-27 | Stop reason: SDUPTHER

## 2022-08-16 ENCOUNTER — TELEPHONE (OUTPATIENT)
Dept: UROLOGY | Facility: CLINIC | Age: 87
End: 2022-08-16

## 2022-08-16 DIAGNOSIS — N39.0 RECURRENT UTI: Primary | ICD-10-CM

## 2022-08-19 DIAGNOSIS — N39.0 RECURRENT UTI: Primary | ICD-10-CM

## 2022-08-23 ENCOUNTER — TELEPHONE (OUTPATIENT)
Dept: UROLOGY | Facility: CLINIC | Age: 87
End: 2022-08-23

## 2022-08-25 ENCOUNTER — PROCEDURE VISIT (OUTPATIENT)
Dept: UROLOGY | Facility: CLINIC | Age: 87
End: 2022-08-25

## 2022-08-25 DIAGNOSIS — N39.0 RECURRENT UTI: Primary | ICD-10-CM

## 2022-08-25 PROCEDURE — 99212 OFFICE O/P EST SF 10 MIN: CPT

## 2022-08-25 PROCEDURE — 87086 URINE CULTURE/COLONY COUNT: CPT

## 2022-08-26 ENCOUNTER — TELEPHONE (OUTPATIENT)
Dept: UROLOGY | Facility: CLINIC | Age: 87
End: 2022-08-26

## 2022-08-26 LAB — BACTERIA SPEC AEROBE CULT: NO GROWTH

## 2022-08-29 ENCOUNTER — TELEPHONE (OUTPATIENT)
Dept: UROLOGY | Facility: CLINIC | Age: 87
End: 2022-08-29

## 2022-10-27 DIAGNOSIS — N39.0 RECURRENT UTI: ICD-10-CM

## 2022-10-27 RX ORDER — NITROFURANTOIN MACROCRYSTALS 50 MG/1
50 CAPSULE ORAL NIGHTLY
Qty: 90 CAPSULE | Refills: 0 | Status: SHIPPED | OUTPATIENT
Start: 2022-10-27 | End: 2023-01-30 | Stop reason: SDUPTHER

## 2023-01-30 ENCOUNTER — TELEPHONE (OUTPATIENT)
Dept: UROLOGY | Facility: CLINIC | Age: 88
End: 2023-01-30
Payer: MEDICARE

## 2023-01-30 DIAGNOSIS — N39.0 RECURRENT UTI: ICD-10-CM

## 2023-01-30 RX ORDER — NITROFURANTOIN MACROCRYSTALS 50 MG/1
50 CAPSULE ORAL NIGHTLY
Qty: 90 CAPSULE | Refills: 0 | Status: SHIPPED | OUTPATIENT
Start: 2023-01-30 | End: 2023-03-16 | Stop reason: SDUPTHER

## 2023-03-16 ENCOUNTER — TELEPHONE (OUTPATIENT)
Dept: UROLOGY | Facility: CLINIC | Age: 88
End: 2023-03-16
Payer: MEDICARE

## 2023-03-16 ENCOUNTER — OFFICE VISIT (OUTPATIENT)
Dept: UROLOGY | Facility: CLINIC | Age: 88
End: 2023-03-16
Payer: MEDICARE

## 2023-03-16 VITALS — WEIGHT: 114 LBS | TEMPERATURE: 98 F | HEIGHT: 62 IN | BODY MASS INDEX: 20.98 KG/M2

## 2023-03-16 DIAGNOSIS — N39.0 RECURRENT UTI: Primary | ICD-10-CM

## 2023-03-16 PROCEDURE — 1159F MED LIST DOCD IN RCRD: CPT

## 2023-03-16 PROCEDURE — 1160F RVW MEDS BY RX/DR IN RCRD: CPT

## 2023-03-16 PROCEDURE — 99214 OFFICE O/P EST MOD 30 MIN: CPT

## 2023-03-16 RX ORDER — SERTRALINE HYDROCHLORIDE 100 MG/1
1 TABLET, FILM COATED ORAL DAILY
COMMUNITY
Start: 2023-02-17

## 2023-03-16 RX ORDER — NITROFURANTOIN MACROCRYSTALS 50 MG/1
50 CAPSULE ORAL NIGHTLY
Qty: 90 CAPSULE | Refills: 3 | Status: SHIPPED | OUTPATIENT
Start: 2023-03-16

## 2023-04-24 ENCOUNTER — TELEPHONE (OUTPATIENT)
Dept: ENT CLINIC | Age: 88
End: 2023-04-24

## 2023-09-29 ENCOUNTER — HOSPITAL ENCOUNTER (EMERGENCY)
Facility: HOSPITAL | Age: 88
Discharge: HOME | End: 2023-09-29
Attending: EMERGENCY MEDICINE
Payer: MEDICARE

## 2023-09-29 ENCOUNTER — APPOINTMENT (OUTPATIENT)
Dept: GENERAL RADIOLOGY | Facility: HOSPITAL | Age: 88
End: 2023-09-29
Payer: MEDICARE

## 2023-09-29 VITALS
DIASTOLIC BLOOD PRESSURE: 73 MMHG | TEMPERATURE: 97.9 F | BODY MASS INDEX: 20.24 KG/M2 | HEIGHT: 62 IN | RESPIRATION RATE: 18 BRPM | HEART RATE: 79 BPM | OXYGEN SATURATION: 95 % | SYSTOLIC BLOOD PRESSURE: 142 MMHG | WEIGHT: 110 LBS

## 2023-09-29 DIAGNOSIS — I48.91 ATRIAL FIBRILLATION WITH CONTROLLED VENTRICULAR RATE: ICD-10-CM

## 2023-09-29 DIAGNOSIS — R79.89 ELEVATED BRAIN NATRIURETIC PEPTIDE (BNP) LEVEL: Primary | ICD-10-CM

## 2023-09-29 LAB
ALBUMIN SERPL-MCNC: 4 G/DL (ref 3.5–5.2)
ALBUMIN/GLOB SERPL: 1.4 G/DL
ALP SERPL-CCNC: 67 U/L (ref 39–117)
ALT SERPL W P-5'-P-CCNC: 11 U/L (ref 1–33)
ANION GAP SERPL CALCULATED.3IONS-SCNC: 11 MMOL/L (ref 5–15)
AST SERPL-CCNC: 22 U/L (ref 1–32)
BASOPHILS # BLD AUTO: 0.04 10*3/MM3 (ref 0–0.2)
BASOPHILS NFR BLD AUTO: 0.5 % (ref 0–1.5)
BILIRUB SERPL-MCNC: 0.4 MG/DL (ref 0–1.2)
BUN SERPL-MCNC: 17 MG/DL (ref 8–23)
BUN/CREAT SERPL: 23 (ref 7–25)
CALCIUM SPEC-SCNC: 9.1 MG/DL (ref 8.2–9.6)
CHLORIDE SERPL-SCNC: 103 MMOL/L (ref 98–107)
CO2 SERPL-SCNC: 27 MMOL/L (ref 22–29)
CREAT SERPL-MCNC: 0.74 MG/DL (ref 0.57–1)
DEPRECATED RDW RBC AUTO: 44.4 FL (ref 37–54)
DIGOXIN SERPL-MCNC: 0.9 NG/ML (ref 0.6–1.2)
EGFRCR SERPLBLD CKD-EPI 2021: 75.5 ML/MIN/1.73
EOSINOPHIL # BLD AUTO: 0.11 10*3/MM3 (ref 0–0.4)
EOSINOPHIL NFR BLD AUTO: 1.4 % (ref 0.3–6.2)
ERYTHROCYTE [DISTWIDTH] IN BLOOD BY AUTOMATED COUNT: 12.5 % (ref 12.3–15.4)
GLOBULIN UR ELPH-MCNC: 2.9 GM/DL
GLUCOSE SERPL-MCNC: 87 MG/DL (ref 65–99)
HCT VFR BLD AUTO: 41.4 % (ref 34–46.6)
HGB BLD-MCNC: 13.2 G/DL (ref 12–15.9)
IMM GRANULOCYTES # BLD AUTO: 0.03 10*3/MM3 (ref 0–0.05)
IMM GRANULOCYTES NFR BLD AUTO: 0.4 % (ref 0–0.5)
LYMPHOCYTES # BLD AUTO: 1.47 10*3/MM3 (ref 0.7–3.1)
LYMPHOCYTES NFR BLD AUTO: 18.6 % (ref 19.6–45.3)
MCH RBC QN AUTO: 30.5 PG (ref 26.6–33)
MCHC RBC AUTO-ENTMCNC: 31.9 G/DL (ref 31.5–35.7)
MCV RBC AUTO: 95.6 FL (ref 79–97)
MONOCYTES # BLD AUTO: 0.75 10*3/MM3 (ref 0.1–0.9)
MONOCYTES NFR BLD AUTO: 9.5 % (ref 5–12)
NEUTROPHILS NFR BLD AUTO: 5.51 10*3/MM3 (ref 1.7–7)
NEUTROPHILS NFR BLD AUTO: 69.6 % (ref 42.7–76)
NRBC BLD AUTO-RTO: 0 /100 WBC (ref 0–0.2)
NT-PROBNP SERPL-MCNC: 2125 PG/ML (ref 0–1800)
PLATELET # BLD AUTO: 222 10*3/MM3 (ref 140–450)
PMV BLD AUTO: 10.2 FL (ref 6–12)
POTASSIUM SERPL-SCNC: 4.5 MMOL/L (ref 3.5–5.2)
PROT SERPL-MCNC: 6.9 G/DL (ref 6–8.5)
RBC # BLD AUTO: 4.33 10*6/MM3 (ref 3.77–5.28)
SODIUM SERPL-SCNC: 141 MMOL/L (ref 136–145)
T4 FREE SERPL-MCNC: 1 NG/DL (ref 0.93–1.7)
TSH SERPL DL<=0.05 MIU/L-ACNC: 3.69 UIU/ML (ref 0.27–4.2)
WBC NRBC COR # BLD: 7.91 10*3/MM3 (ref 3.4–10.8)

## 2023-09-29 PROCEDURE — 84439 ASSAY OF FREE THYROXINE: CPT | Performed by: EMERGENCY MEDICINE

## 2023-09-29 PROCEDURE — 80053 COMPREHEN METABOLIC PANEL: CPT | Performed by: EMERGENCY MEDICINE

## 2023-09-29 PROCEDURE — 83880 ASSAY OF NATRIURETIC PEPTIDE: CPT | Performed by: EMERGENCY MEDICINE

## 2023-09-29 PROCEDURE — 80162 ASSAY OF DIGOXIN TOTAL: CPT | Performed by: EMERGENCY MEDICINE

## 2023-09-29 PROCEDURE — 71045 X-RAY EXAM CHEST 1 VIEW: CPT

## 2023-09-29 PROCEDURE — 93005 ELECTROCARDIOGRAM TRACING: CPT | Performed by: EMERGENCY MEDICINE

## 2023-09-29 PROCEDURE — 85025 COMPLETE CBC W/AUTO DIFF WBC: CPT | Performed by: EMERGENCY MEDICINE

## 2023-09-29 PROCEDURE — 99284 EMERGENCY DEPT VISIT MOD MDM: CPT

## 2023-09-29 PROCEDURE — 84443 ASSAY THYROID STIM HORMONE: CPT | Performed by: EMERGENCY MEDICINE

## 2023-09-29 RX ORDER — SODIUM CHLORIDE 0.9 % (FLUSH) 0.9 %
10 SYRINGE (ML) INJECTION AS NEEDED
Status: DISCONTINUED | OUTPATIENT
Start: 2023-09-29 | End: 2023-09-29 | Stop reason: HOSPADM

## 2023-09-30 LAB
QT INTERVAL: 354 MS
QTC INTERVAL: 423 MS

## 2024-02-26 ENCOUNTER — TELEPHONE (OUTPATIENT)
Dept: PODIATRY | Facility: CLINIC | Age: 89
End: 2024-02-26
Payer: MEDICARE

## 2024-04-16 ENCOUNTER — TELEPHONE (OUTPATIENT)
Dept: UROLOGY | Facility: CLINIC | Age: 89
End: 2024-04-16
Payer: MEDICARE

## 2024-04-23 ENCOUNTER — TELEPHONE (OUTPATIENT)
Dept: UROLOGY | Facility: CLINIC | Age: 89
End: 2024-04-23
Payer: MEDICARE

## 2024-05-01 ENCOUNTER — TELEPHONE (OUTPATIENT)
Dept: UROLOGY | Facility: CLINIC | Age: 89
End: 2024-05-01
Payer: MEDICARE

## 2024-05-02 ENCOUNTER — APPOINTMENT (OUTPATIENT)
Dept: OTHER | Facility: HOSPITAL | Age: 89
End: 2024-05-02
Payer: MEDICARE

## 2024-05-02 ENCOUNTER — OFFICE VISIT (OUTPATIENT)
Dept: UROLOGY | Facility: CLINIC | Age: 89
End: 2024-05-02
Payer: MEDICARE

## 2024-05-02 VITALS — HEIGHT: 62 IN | WEIGHT: 110 LBS | BODY MASS INDEX: 20.24 KG/M2 | TEMPERATURE: 97.5 F

## 2024-05-02 DIAGNOSIS — N39.0 RECURRENT UTI: Primary | ICD-10-CM

## 2024-05-02 LAB
BILIRUB BLD-MCNC: NEGATIVE MG/DL
CLARITY, POC: ABNORMAL
COLOR UR: YELLOW
GLUCOSE UR STRIP-MCNC: ABNORMAL MG/DL
KETONES UR QL: ABNORMAL
LEUKOCYTE EST, POC: ABNORMAL
NITRITE UR-MCNC: NEGATIVE MG/ML
PH UR: 5 [PH] (ref 5–8)
PROT UR STRIP-MCNC: ABNORMAL MG/DL
RBC # UR STRIP: ABNORMAL /UL
SP GR UR: 1.03 (ref 1–1.03)
UROBILINOGEN UR QL: ABNORMAL

## 2024-05-02 PROCEDURE — 87086 URINE CULTURE/COLONY COUNT: CPT

## 2024-05-02 RX ORDER — CIPROFLOXACIN 500 MG/1
1 TABLET, FILM COATED ORAL EVERY 12 HOURS SCHEDULED
COMMUNITY
Start: 2024-04-23 | End: 2024-05-06

## 2024-05-03 LAB — BACTERIA SPEC AEROBE CULT: ABNORMAL

## 2024-05-06 ENCOUNTER — TELEPHONE (OUTPATIENT)
Dept: UROLOGY | Facility: CLINIC | Age: 89
End: 2024-05-06
Payer: MEDICARE

## 2024-05-06 DIAGNOSIS — B96.89 BACTERIAL VAGINOSIS: Primary | ICD-10-CM

## 2024-05-06 DIAGNOSIS — N76.0 BACTERIAL VAGINOSIS: Primary | ICD-10-CM

## 2024-05-06 RX ORDER — METRONIDAZOLE 500 MG/1
500 TABLET ORAL 3 TIMES DAILY
Qty: 30 TABLET | Refills: 0 | Status: SHIPPED | OUTPATIENT
Start: 2024-05-06 | End: 2024-05-16

## 2024-05-06 RX ORDER — FLUCONAZOLE 100 MG/1
100 TABLET ORAL DAILY
Qty: 3 TABLET | Refills: 0 | Status: SHIPPED | OUTPATIENT
Start: 2024-05-06 | End: 2024-05-09

## 2024-05-14 ENCOUNTER — OFFICE VISIT (OUTPATIENT)
Dept: PALLATIVE CARE | Age: 89
End: 2024-05-14
Payer: MEDICARE

## 2024-05-14 DIAGNOSIS — Z51.5 ENCOUNTER FOR PALLIATIVE CARE: ICD-10-CM

## 2024-05-14 DIAGNOSIS — R10.32 LEFT LOWER QUADRANT ABDOMINAL PAIN: ICD-10-CM

## 2024-05-14 DIAGNOSIS — Z74.1 REQUIRES ASSISTANCE WITH ACTIVITIES OF DAILY LIVING (ADL): ICD-10-CM

## 2024-05-14 DIAGNOSIS — I50.32 CHRONIC DIASTOLIC HEART FAILURE (HCC): Primary | ICD-10-CM

## 2024-05-14 DIAGNOSIS — Z74.09 DECREASED MOBILITY AND ENDURANCE: ICD-10-CM

## 2024-05-14 DIAGNOSIS — F41.9 ANXIETY: ICD-10-CM

## 2024-05-14 PROCEDURE — 1123F ACP DISCUSS/DSCN MKR DOCD: CPT | Performed by: NURSE PRACTITIONER

## 2024-05-14 PROCEDURE — 1090F PRES/ABSN URINE INCON ASSESS: CPT | Performed by: NURSE PRACTITIONER

## 2024-05-14 PROCEDURE — 1036F TOBACCO NON-USER: CPT | Performed by: NURSE PRACTITIONER

## 2024-05-14 PROCEDURE — 99350 HOME/RES VST EST HIGH MDM 60: CPT | Performed by: NURSE PRACTITIONER

## 2024-05-14 PROCEDURE — G8421 BMI NOT CALCULATED: HCPCS | Performed by: NURSE PRACTITIONER

## 2024-05-14 NOTE — PROGRESS NOTES
Supportive Care/Community Based Palliative Care  Initial Consultation Note      Patient Name:  Olivia Molina  Medical Record Number:  [id number]  YOB: 1930    Date of Visit: 5/14/2024  Location of Visit:  Home    Referring Provider: Dr. Lindsay  Patient Care Team:  Diana Lindsay MD as PCP - General (General Surgery)  Quin Machado APRN as Nurse Practitioner (Family Nurse Practitioner)  Andrew Perez MD as Consulting Physician (Cardiology)  Kezia Verdin APRN - CNP as Advanced Practice Nurse (Nurse Practitioner)    Reason for Consult:   Goals of care   Symptom Management    ACP  Family Support  Patient Support    History obtained from:  patient, family member - 3 of her 5 children were present, electronic medical record    PALLIATIVE DIAGNOSES AND ORDERS/RECOMMENDATIONS/PLAN:   1. Decreased mobility and endurance  Continue to use transport chair  Hospital bed ordered    2. Requires assistance with activities of daily living (ADL)  Continue 24 hour care per family    3. Anxiety  Buspar 5 mg TID    4. Left lower quadrant abdominal pain  Contact made to patient's PCP for possible medication    5. Chronic diastolic heart failure (HCC)  Continue Entresto and Farxiga  Monitor symptomology    6. Encounter for palliative care  Current goals of care include: Continue treatment with palliative intent, Focus on comfort and quality of life, Remain at home, Wants comfort focused treatment without hospitalization, Achievement of a peaceful death    Code status confirmed: DNRCC  Will continue goals of care discussions based on clinical course  Follow up home visit in 1 week    CHIEF COMPLAINT:     Chief Complaint   Patient presents with    Referral - General     PCP referral, failure to thrive, abdominal pain       CLINICAL SUMMARY:       Olivia Molina is a 89 y.o. female with PMH of diastolic heart failure, rectal prolapse, uterine prolapse, memory loss, anxiety, and depression.     HPI AND VISIT SUMMARY:

## 2024-05-15 RX ORDER — BUSPIRONE HYDROCHLORIDE 5 MG/1
5 TABLET ORAL 2 TIMES DAILY
Qty: 60 TABLET | Refills: 0 | Status: SHIPPED | OUTPATIENT
Start: 2024-05-15 | End: 2024-06-14

## 2024-05-22 ENCOUNTER — HOSPITAL ENCOUNTER (OUTPATIENT)
Age: 89
Setting detail: SPECIMEN
Discharge: HOME | End: 2024-05-22

## 2024-05-22 LAB
BACTERIA #/AREA URNS HPF: ABNORMAL /HPF
BILIRUB UR QL STRIP: NEGATIVE
CLARITY UR: ABNORMAL
COLOR UR: YELLOW
CRYSTALS URNS MICRO: ABNORMAL /HPF
GLUCOSE UR STRIP.AUTO-MCNC: 250 MG/DL
HGB UR STRIP.AUTO-MCNC: ABNORMAL MG/L
KETONES UR STRIP.AUTO-MCNC: NEGATIVE MG/DL
LEUKOCYTE ESTERASE UR QL STRIP.AUTO: ABNORMAL
NITRITE UR QL STRIP.AUTO: NEGATIVE
PH UR STRIP.AUTO: 6 [PH] (ref 5–8)
PROT UR STRIP.AUTO-MCNC: 30 MG/DL
RBC #/AREA URNS HPF: ABNORMAL /HPF (ref 0–2)
SP GR UR STRIP.AUTO: 1.02 (ref 1–1.03)
SQUAMOUS #/AREA URNS HPF: ABNORMAL /HPF
UROBILINOGEN UR STRIP.AUTO-MCNC: 0.2 E.U./DL
WBC #/AREA URNS HPF: ABNORMAL /HPF (ref 0–5)
YEAST #/AREA URNS HPF: PRESENT /HPF

## 2024-05-22 PROCEDURE — 87086 URINE CULTURE/COLONY COUNT: CPT

## 2024-05-22 PROCEDURE — 87106 FUNGI IDENTIFICATION YEAST: CPT

## 2024-05-22 PROCEDURE — 81001 URINALYSIS AUTO W/SCOPE: CPT

## 2024-05-24 LAB
BACTERIA UR CULT: ABNORMAL
BACTERIA UR CULT: ABNORMAL
ORGANISM: ABNORMAL